# Patient Record
Sex: FEMALE | Race: WHITE | NOT HISPANIC OR LATINO | Employment: FULL TIME | URBAN - METROPOLITAN AREA
[De-identification: names, ages, dates, MRNs, and addresses within clinical notes are randomized per-mention and may not be internally consistent; named-entity substitution may affect disease eponyms.]

---

## 2017-02-21 ENCOUNTER — OFFICE VISIT (OUTPATIENT)
Dept: FAMILY MEDICINE | Facility: CLINIC | Age: 42
End: 2017-02-21
Payer: COMMERCIAL

## 2017-02-21 VITALS
TEMPERATURE: 97.6 F | BODY MASS INDEX: 28.45 KG/M2 | HEART RATE: 66 BPM | SYSTOLIC BLOOD PRESSURE: 100 MMHG | HEIGHT: 66 IN | DIASTOLIC BLOOD PRESSURE: 66 MMHG | RESPIRATION RATE: 16 BRPM | WEIGHT: 177 LBS | OXYGEN SATURATION: 99 %

## 2017-02-21 DIAGNOSIS — Z00.8 ENCOUNTER FOR BIOMETRIC SCREENING: ICD-10-CM

## 2017-02-21 DIAGNOSIS — B07.0 PLANTAR WARTS: ICD-10-CM

## 2017-02-21 DIAGNOSIS — Z00.01 ENCOUNTER FOR ROUTINE ADULT MEDICAL EXAM WITH ABNORMAL FINDINGS: Primary | ICD-10-CM

## 2017-02-21 DIAGNOSIS — Z13.6 CARDIOVASCULAR SCREENING; LDL GOAL LESS THAN 160: ICD-10-CM

## 2017-02-21 LAB
CHOLEST SERPL-MCNC: 154 MG/DL
GLUCOSE SERPL-MCNC: 93 MG/DL (ref 70–99)
HDLC SERPL-MCNC: 53 MG/DL
LDLC SERPL CALC-MCNC: 87 MG/DL
NONHDLC SERPL-MCNC: 101 MG/DL
TRIGL SERPL-MCNC: 68 MG/DL

## 2017-02-21 PROCEDURE — 99395 PREV VISIT EST AGE 18-39: CPT | Mod: 25 | Performed by: FAMILY MEDICINE

## 2017-02-21 PROCEDURE — 82947 ASSAY GLUCOSE BLOOD QUANT: CPT | Performed by: FAMILY MEDICINE

## 2017-02-21 PROCEDURE — 17110 DESTRUCTION B9 LES UP TO 14: CPT | Performed by: FAMILY MEDICINE

## 2017-02-21 PROCEDURE — 36415 COLL VENOUS BLD VENIPUNCTURE: CPT | Performed by: FAMILY MEDICINE

## 2017-02-21 PROCEDURE — 80061 LIPID PANEL: CPT | Performed by: FAMILY MEDICINE

## 2017-02-21 NOTE — NURSING NOTE
"Chief Complaint   Patient presents with     Physical     /66 (BP Location: Left arm, Cuff Size: Adult Regular)  Pulse 66  Temp 97.6  F (36.4  C) (Oral)  Resp 16  Ht 5' 6\" (1.676 m)  Wt 177 lb (80.3 kg)  SpO2 99%  BMI 28.57 kg/m2 Estimated body mass index is 28.57 kg/(m^2) as calculated from the following:    Height as of this encounter: 5' 6\" (1.676 m).    Weight as of this encounter: 177 lb (80.3 kg).  bp completed using cuff size: regular       Health Maintenance addressed:  NONE    n/a    Korin Rosenthal MA     "

## 2017-02-21 NOTE — MR AVS SNAPSHOT
After Visit Summary   2/21/2017    Cheyenne Kapoor    MRN: [id number]           Patient Information     Date Of Birth          1975        Visit Information        Provider Department      2/21/2017 8:00 AM Maris Garces MD [facility]        Today's Diagnoses     Encounter for routine adult medical exam with abnormal findings    -  1    Plantar warts        CARDIOVASCULAR SCREENING; LDL GOAL LESS THAN 160        Encounter for biometric screening          Care Instructions      Preventive Health Recommendations  Female Ages 26 - 39  Yearly exam:   See your health care provider every year in order to    Review health changes.     Discuss preventive care.      Review your medicines if you your doctor has prescribed any.    Until age 30: Get a Pap test every three years (more often if you have had an abnormal result).    After age 30: Talk to your doctor about whether you should have a Pap test every 3 years or have a Pap test with HPV screening every 5 years.   You do not need a Pap test if your uterus was removed (hysterectomy) and you have not had cancer.  You should be tested each year for STDs (sexually transmitted diseases), if you're at risk.   Talk to your provider about how often to have your cholesterol checked.  If you are at risk for diabetes, you should have a diabetes test (fasting glucose).  Shots: Get a flu shot each year. Get a tetanus shot every 10 years.   Nutrition:     Eat at least 5 servings of fruits and vegetables each day.    Eat whole-grain bread, whole-wheat pasta and brown rice instead of white grains and rice.    Talk to your provider about Calcium and Vitamin D.     Lifestyle    Exercise at least 150 minutes a week (30 minutes a day, 5 days of the week). This will help you control your weight and prevent disease.    Limit alcohol to one drink per day.    No smoking.     Wear sunscreen to prevent skin cancer.    See your dentist every six  "months for an exam and cleaning.          Follow-ups after your visit        Who to contact     If you have questions or need follow up information about today's clinic visit or your schedule please contact [facility] directly at [phone].  Normal or non-critical lab and imaging results will be communicated to you by Synfor[employer]art, letter or phone within 4 business days after the clinic has received the results. If you do not hear from us within 7 days, please contact the clinic through [employer] or phone. If you have a critical or abnormal lab result, we will notify you by phone as soon as possible.  Submit refill requests through [employer] or call your pharmacy and they will forward the refill request to us. Please allow 3 business days for your refill to be completed.          Additional Information About Your Visit        [employer] Information     [employer] gives you secure access to your electronic health record. If you see a primary care provider, you can also send messages to your care team and make appointments. If you have questions, please call your primary care clinic.  If you do not have a primary care provider, please call [phone] and they will assist you.        Care EveryWhere ID     This is your Care EveryWhere ID. This could be used by other organizations to access your [place] medical records  [id number]        Your Vitals Were     Pulse Temperature Respirations Height Pulse Oximetry BMI (Body Mass Index)    66 97.6  F (36.4  C) (Oral) 16 5' 6\" (1.676 m) 99% 28.57 kg/m2       Blood Pressure from Last 3 Encounters:   02/21/17 100/66   08/05/16 104/70   02/16/16 96/66    Weight from Last 3 Encounters:   02/21/17 177 lb (80.3 kg)   08/05/16 169 lb (76.7 kg)   02/16/16 168 lb 6.4 oz (76.4 kg)              We Performed the Following     Glucose     Lipid Profile with reflex to direct LDL        Primary Care Provider Office Phone # Fax #    Maris Garces [phone]    "    [facility] [address] [facility]  [address]  [facility] [address]        Thank you!     Thank you for choosing [facility]  for your care. Our goal is always to provide you with excellent care. Hearing back from our patients is one way we can continue to improve our services. Please take a few minutes to complete the written survey that you may receive in the mail after your visit with us. Thank you!             Your Updated Medication List - Protect others around you: Learn how to safely use, store and throw away your medicines at www.disposemymeds.org.          This list is accurate as of: 2/21/17  8:32 AM.  Always use your most recent med list.                   Brand Name Dispense Instructions for use    cholecalciferol 1000 UNIT tablet    vitamin D    100 tablet    Take 1-2 tablets (1,000-2,000 Units) by mouth daily       MIRENA (52 MG) 20 MCG/24HR IUD   Generic drug:  levonorgestrel      1 each by Intrauterine route once Placed 07/21/2011       vitamin B complex with vitamin C Tabs tablet      Take 1 tablet by mouth daily

## 2017-02-21 NOTE — PROGRESS NOTES
SUBJECTIVE:     CC: Cheyenne Kapoor is an 41 year old woman who presents for preventive health visit.     Physical   Annual:     Getting at least 3 servings of Calcium per day::  Yes    Bi-annual eye exam::  NO    Dental care twice a year::  Yes    Sleep apnea or symptoms of sleep apnea::  None    Diet::  Regular (no restrictions)    Frequency of exercise::  4-5 days/week    Duration of exercise::  45-60 minutes    Taking medications regularly::  Yes    Medication side effects::  Not applicable    Additional concerns today::  No    Mirena-   Taken out mirena IUD at last appt- misses it.  Doesn't need it-  got vasectomy.  Notices more ups and downs with mood.  Periods are back, but just two days, though each period is progressively more flow.  Never debilitating.  Fine staying off it unless sx's significantly worse.      Left heel- plantar wart, likely a few yrs.  Tried acid patches.  Not going away.    Weight issues-  Wants to lose ~10 lbs- watching diet and exercising.  Last year was ~15 lbs lighter.  Still down overall.  [employer] in 11/15 to ~2/16.  Stable in 8/16, now up ~15 lbs- at this weight for ~2 lbs.  Felt amazing on the [employer].  Does use some of their snacks now.  Focuses on a healthy gut, which helps her feel better.  Has to go and see them 2x/wk, consuming.  When she hit her weight, she wanted to be done.    Exercise- crossfit 3x/wk, then walks and ski- downhill.  Next week- [place] skiing.        Today's PHQ-2 Score:   PHQ-2 ( 1999 Pfizer) 2/20/2017   Q1: Little interest or pleasure in doing things -   Q2: Feeling down, depressed or hopeless -   PHQ-2 Score -   Little interest or pleasure in doing things Not at all   Feeling down, depressed or hopeless Not at all   PHQ-2 Score 0       Abuse: Current or Past(Physical, Sexual or Emotional)- No  Do you feel safe in your environment - Yes    Social History   Substance Use Topics     Smoking status: Never Smoker     Smokeless  tobacco: Never Used     Alcohol use Yes      Comment: 2 drinks monthly     The patient does not drink >3 drinks per day nor >7 drinks per week.    Recent Labs   Lab Test  02/16/16   0859 02/09/15  11/08/13   0840  02/10/12   0919   CHOL  164  168  160  153   HDL  48*  59  47*  55   LDL  109*  99  104  87   TRIG  34  52  47  58   CHOLHDLRATIO   --    --   3.4  2.8   NHDL  116   --    --    --        Reviewed orders with patient.  Reviewed health maintenance and updated orders accordingly - Yes    Mammo Decision Support:  Patient under age 50, mutual decision reflected in health maintenance.      Pertinent mammograms are reviewed under the imaging tab.  History of abnormal Pap smear: NO - age 30-65 PAP every 5 years with negative HPV co-testing recommended  All Histories reviewed and updated in Epic.    Patient Active Problem List   Diagnosis     Seasonal allergic rhinitis     CARDIOVASCULAR SCREENING; LDL GOAL LESS THAN 160     Melanocytic nevus of left lower extremity      Current Outpatient Prescriptions   Medication Sig Dispense Refill     vitamin  B complex with vitamin C (VITAMIN  B COMPLEX) TABS Take 1 tablet by mouth daily       cholecalciferol (VITAMIN D) 1000 UNIT tablet Take 1-2 tablets (1,000-2,000 Units) by mouth daily 100 tablet 3     levonorgestrel (MIRENA) 20 MCG/24HR IUD 1 each by Intrauterine route once Placed 07/21/2011           Allergies   Allergen Reactions     Amoxicillin Rash     Ceclor [Cefaclor Monohydrate] Rash     Penicillin G Rash        ROS:   ROS: 10 point ROS neg other than the symptoms noted above in the HPI.    Problem list, Medication list, Allergies, and Medical/Social/Surgical histories reviewed in [facility] and updated as appropriate.  BP Readings from Last 3 Encounters:   02/21/17 100/66   08/05/16 104/70   02/16/16 96/66    Wt Readings from Last 3 Encounters:   02/21/17 177 lb (80.3 kg)   08/05/16 169 lb (76.7 kg)   02/16/16 168 lb 6.4 oz (76.4 kg)           OBJECTIVE:     /66  "(BP Location: Left arm, Cuff Size: Adult Regular)  Pulse 66  Temp 97.6  F (36.4  C) (Oral)  Resp 16  Ht 5' 6\" (1.676 m)  Wt 177 lb (80.3 kg)  SpO2 99%  BMI 28.57 kg/m2  EXAM:  GENERAL: healthy, alert and no distress  EYES: Eyes grossly normal to inspection, PERRL and conjunctivae and sclerae normal  HENT: ear canals and TM's normal, nose and mouth without ulcers or lesions  NECK: no adenopathy, no asymmetry, masses, or scars and thyroid normal to palpation  RESP: lungs clear to auscultation - no rales, rhonchi or wheezes  BREAST: normal without masses, tenderness or nipple discharge and no palpable axillary masses or adenopathy  CV: regular rate and rhythm, normal S1 S2, no S3 or S4, no murmur, click or rub, no peripheral edema and peripheral pulses strong  ABDOMEN: soft, nontender, no hepatosplenomegaly, no masses and bowel sounds normal  MS: no gross musculoskeletal defects noted, no edema  SKIN: no suspicious lesions or rashes, other than ~4mm plantar wart on left heel, disrupts skin lines  NEURO: Normal strength and tone, mentation intact and speech normal  PSYCH: mentation appears normal, affect normal/bright    ASSESSMENT/PLAN:         ICD-10-CM    1. Encounter for routine adult medical exam with abnormal findings Z00.01    2. Plantar warts B07.0 DESTRUCT BENIGN LESION, UP TO 14   3. CARDIOVASCULAR SCREENING; LDL GOAL LESS THAN 160 Z13.6 Lipid Profile with reflex to direct LDL   4. Encounter for biometric screening Z01.89 Lipid Profile with reflex to direct LDL     Glucose     CPE- Discussed diet, calcium and exercise.  Went over Self Breast Exam.  Thin prep pap was not done.  Eyes and Teeth or UTD or recommended f/u.  No immunizations needed today.  See orders below for tests ordered and screening needed.      Plantar wart- left heel, ~4mm size.  There for a few yrs.  Discussed txt options, pt elects to treat with cryotherapy today.  Discussed risks/benefits, pt elects to proceed.  Plantar wart was frozen " "easily with pulse mechanism with liquid nitrogen for ~15 seconds, maintaining good white weel around wart.  A total of 1 warts are treated today.  The etiology of common warts were discussed.  Cheyenne will continue to use the over-the-counter medications such as Compound W under occlusion on a nightly  basis.  Warm soapy water soaks and sanding also recommended.  The patient is to rtc as able q3-4 weeks until all warts have resolved.    Biometric forms- would like to get a copy sent to her- can fax in to company and send copy to her.      COUNSELING:  Reviewed preventive health counseling, as reflected in patient instructions  Special attention given to:        Regular exercise       Healthy diet/nutrition       Contraception       Osteoporosis Prevention/Bone Health       (Elyssa)menopause management         reports that she has never smoked. She has never used smokeless tobacco.    Estimated body mass index is 26.47 kg/(m^2) as calculated from the following:    Height as of 8/5/16: 5' 7\" (1.702 m).    Weight as of 8/5/16: 169 lb (76.7 kg).   Weight management plan: cont work on diet/exercise- discussed tips/options    Counseling Resources:  ATP IV Guidelines  Pooled Cohorts Equation Calculator  Breast Cancer Risk Calculator  FRAX Risk Assessment  ICSI Preventive Guidelines  Dietary Guidelines for Americans, 2010Answers for HPI/ROS submitted by the patient on 2/20/2017   PHQ-2 Depressed: Not at all, Not at all  PHQ-2 Score: 0    USDA's MyPlate  ASA Prophylaxis  Lung CA Screening    Maris Garces MD      "

## 2017-02-23 NOTE — PROGRESS NOTES
Here are your lab results from your recent visit...  -Your glucose is in the normal range at 93 (<100 is normal), so there is no sign of diabetes or pre-diabetes.   -Your cholesterol panel also looks great with a very low LDL (the bad cholesterol) and a pretty good HDL (the good cholesterol).     Please let me know if you have any questions.  Best,   Conrad Garces MD

## 2017-09-26 ENCOUNTER — OFFICE VISIT (OUTPATIENT)
Dept: FAMILY MEDICINE | Facility: CLINIC | Age: 42
End: 2017-09-26
Payer: COMMERCIAL

## 2017-09-26 VITALS
WEIGHT: 179 LBS | HEART RATE: 58 BPM | OXYGEN SATURATION: 100 % | RESPIRATION RATE: 14 BRPM | DIASTOLIC BLOOD PRESSURE: 68 MMHG | BODY MASS INDEX: 28.77 KG/M2 | SYSTOLIC BLOOD PRESSURE: 98 MMHG | TEMPERATURE: 97.9 F | HEIGHT: 66 IN

## 2017-09-26 DIAGNOSIS — F32.1 MODERATE SINGLE CURRENT EPISODE OF MAJOR DEPRESSIVE DISORDER (H): Primary | ICD-10-CM

## 2017-09-26 PROCEDURE — 99214 OFFICE O/P EST MOD 30 MIN: CPT | Performed by: FAMILY MEDICINE

## 2017-09-26 PROCEDURE — 84443 ASSAY THYROID STIM HORMONE: CPT | Performed by: FAMILY MEDICINE

## 2017-09-26 PROCEDURE — 36415 COLL VENOUS BLD VENIPUNCTURE: CPT | Performed by: FAMILY MEDICINE

## 2017-09-26 PROCEDURE — 82306 VITAMIN D 25 HYDROXY: CPT | Performed by: FAMILY MEDICINE

## 2017-09-26 RX ORDER — ESCITALOPRAM OXALATE 10 MG/1
10 TABLET ORAL DAILY
Qty: 30 TABLET | Refills: 1 | Status: SHIPPED | OUTPATIENT
Start: 2017-09-26 | End: 2017-11-10

## 2017-09-26 ASSESSMENT — PATIENT HEALTH QUESTIONNAIRE - PHQ9
5. POOR APPETITE OR OVEREATING: SEVERAL DAYS
SUM OF ALL RESPONSES TO PHQ QUESTIONS 1-9: 8

## 2017-09-26 ASSESSMENT — ANXIETY QUESTIONNAIRES
5. BEING SO RESTLESS THAT IT IS HARD TO SIT STILL: NOT AT ALL
GAD7 TOTAL SCORE: 6
1. FEELING NERVOUS, ANXIOUS, OR ON EDGE: SEVERAL DAYS
3. WORRYING TOO MUCH ABOUT DIFFERENT THINGS: MORE THAN HALF THE DAYS
7. FEELING AFRAID AS IF SOMETHING AWFUL MIGHT HAPPEN: NOT AT ALL
IF YOU CHECKED OFF ANY PROBLEMS ON THIS QUESTIONNAIRE, HOW DIFFICULT HAVE THESE PROBLEMS MADE IT FOR YOU TO DO YOUR WORK, TAKE CARE OF THINGS AT HOME, OR GET ALONG WITH OTHER PEOPLE: SOMEWHAT DIFFICULT
6. BECOMING EASILY ANNOYED OR IRRITABLE: SEVERAL DAYS
2. NOT BEING ABLE TO STOP OR CONTROL WORRYING: SEVERAL DAYS

## 2017-09-26 NOTE — MR AVS SNAPSHOT
"              After Visit Summary   9/26/2017    Cheyenne Kapoor    MRN: [id number]           Patient Information     Date Of Birth          1975        Visit Information        Provider Department      9/26/2017 11:30 AM Maris Garces MD [facility]        Today's Diagnoses     Moderate single current episode of major depressive disorder (H)    -  1       Follow-ups after your visit        Who to contact     If you have questions or need follow up information about today's clinic visit or your schedule please contact [facility] directly at [phone].  Normal or non-critical lab and imaging results will be communicated to you by [employer], letter or phone within 4 business days after the clinic has received the results. If you do not hear from us within 7 days, please contact the clinic through [employer] or phone. If you have a critical or abnormal lab result, we will notify you by phone as soon as possible.  Submit refill requests through [employer] or call your pharmacy and they will forward the refill request to us. Please allow 3 business days for your refill to be completed.          Additional Information About Your Visit        MyChart Information     [employer] gives you secure access to your electronic health record. If you see a primary care provider, you can also send messages to your care team and make appointments. If you have questions, please call your primary care clinic.  If you do not have a primary care provider, please call [phone] and they will assist you.        Care EveryWhere ID     This is your Care EveryWhere ID. This could be used by other organizations to access your [place] medical records  [id number]        Your Vitals Were     Pulse Temperature Respirations Height Pulse Oximetry Breastfeeding?    58 97.9  F (36.6  C) (Oral) 14 5' 6\" (1.676 m) 100% No    BMI (Body Mass Index)                   28.89 kg/m2            Blood Pressure from " Last 3 Encounters:   09/26/17 98/68   02/21/17 100/66   08/05/16 104/70    Weight from Last 3 Encounters:   09/26/17 179 lb (81.2 kg)   02/21/17 177 lb (80.3 kg)   08/05/16 169 lb (76.7 kg)              We Performed the Following     TSH with free T4 reflex     Vitamin D Deficiency          Today's Medication Changes          These changes are accurate as of: 9/26/17 12:17 PM.  If you have any questions, ask your nurse or doctor.               Start taking these medicines.        Dose/Directions    escitalopram 10 MG tablet   Commonly known as:  LEXAPRO   Used for:  Moderate single current episode of major depressive disorder (H)   Started by:  Maris Garces MD        Dose:  10 mg   Take 1 tablet (10 mg) by mouth daily   Quantity:  30 tablet   Refills:  1            Where to get your medicines      These medications were sent to [facility] [id number] [place] [address] [facility]  [facility] [place] [address]     Phone:  [phone]     escitalopram 10 MG tablet                Primary Care Provider Office Phone # Fax #    Maris Garces [phone] [address] [address]        Equal Access to Services     BRENDA MADRIGAL AH: Hadii aad ku hadasho Soomaali, waaxda luqadaha, qaybta kaalmada adeegyada, waxay taliain hayaany cardenas . So [facility] [phone].    ATENCIÓN: Si habla español, tiene a knight disposición servicios gratuitos de asistencia lingüística. Llame al [phone].    We comply with applicable federal civil rights laws and [place] laws. We do not discriminate on the basis of race, color, national origin, age, disability sex, sexual orientation or gender identity.            Thank you!     Thank you for choosing [facility]  for your care. Our goal is always to provide you with excellent care. Hearing back from our patients is one way we can continue to improve our services.  Please take a few minutes to complete the written survey that you may receive in the mail after your visit with us. Thank you!             Your Updated Medication List - Protect others around you: Learn how to safely use, store and throw away your medicines at www.disposemymeds.org.          This list is accurate as of: 9/26/17 12:17 PM.  Always use your most recent med list.                   Brand Name Dispense Instructions for use Diagnosis    cholecalciferol 1000 UNIT tablet    vitamin D    100 tablet    Take 1-2 tablets (1,000-2,000 Units) by mouth daily    Encounter for routine adult health examination without abnormal findings       escitalopram 10 MG tablet    LEXAPRO    30 tablet    Take 1 tablet (10 mg) by mouth daily    Moderate single current episode of major depressive disorder (H)       vitamin B complex with vitamin C Tabs tablet      Take 1 tablet by mouth daily

## 2017-09-26 NOTE — NURSING NOTE
"Chief Complaint   Patient presents with     Depression     Anxiety       Initial BP 98/68  Pulse 58  Temp 97.9  F (36.6  C) (Oral)  Resp 14  Ht 5' 6\" (1.676 m)  Wt 179 lb (81.2 kg)  SpO2 100%  Breastfeeding? No  BMI 28.89 kg/m2 Estimated body mass index is 28.89 kg/(m^2) as calculated from the following:    Height as of this encounter: 5' 6\" (1.676 m).    Weight as of this encounter: 179 lb (81.2 kg).  BP completed using cuff size: regular    Health Maintenance that is potentially due pending provider review:  Health Maintenance Due   Topic Date Due     INFLUENZA VACCINE (SYSTEM ASSIGNED)  09/01/2017     PHQ9 and GAD7 given to pt    Per pt and provider  "

## 2017-09-26 NOTE — PROGRESS NOTES
SUBJECTIVE:   Cheyenne Kapoor is a 42 year old female who presents to clinic today for the following health issues:    Abnormal Mood Symptoms    Duration: 6-7 months    Description:    Depression: Sadness and lack of motivation, symptoms are worst around time of period, started after removal of IUD in 8/16  Anxiety: Wakes up during night and unable to fall back asleep due to racing thoughts, occurs 3x per month  Panic attacks: None     Accompanying signs and symptoms: None    History: Therapy 2 years ago when feeling overwhelmed with family and work, states that it was effective but does not believe it would be beneficial at this time    Precipitating or alleviating factors: None    Therapies tried and outcome: None, tries to maintain healthy diet and exercises 3-4x per week but mood sometimes interferes      Problem list and histories reviewed and adjusted: As indicated  Additional history: As documented    Patient Active Problem List   Diagnosis     Seasonal allergic rhinitis     CARDIOVASCULAR SCREENING; LDL GOAL LESS THAN 160     Melanocytic nevus of left lower extremity     Past Surgical History:   Procedure Laterality Date     C IUD,MIRENA  7/11       Social History   Substance Use Topics     Smoking status: Never Smoker     Smokeless tobacco: Never Used     Alcohol use Yes      Comment: 2 drinks monthly     Family History   Problem Relation Age of Onset     Allergies Mother      Lipids Mother      Obesity Mother      Allergies Father      Lipids Father      Arthritis Father      Eye Disorder Father      macular degeneration     Allergies Sister      Alzheimer Disease Paternal Grandmother      Alzheimer Disease Maternal Grandfather      Arthritis Paternal Grandfather      Lipids Sister          Current Outpatient Prescriptions   Medication Sig Dispense Refill     escitalopram (LEXAPRO) 10 MG tablet Take 1 tablet (10 mg) by mouth daily 30 tablet 1     vitamin  B complex with vitamin C (VITAMIN  B  "COMPLEX) TABS Take 1 tablet by mouth daily       cholecalciferol (VITAMIN D) 1000 UNIT tablet Take 1-2 tablets (1,000-2,000 Units) by mouth daily 100 tablet 3     Allergies   Allergen Reactions     Amoxicillin Rash     Ceclor [Cefaclor Monohydrate] Rash     Penicillin G Rash     Recent Labs   Lab Test  09/26/17   1219  02/21/17   0855  02/16/16   0859 02/09/15   LDL   --   87  109*  99   HDL   --   53  48*  59   TRIG   --   68  34  52   TSH  1.12   --    --    --       BP Readings from Last 3 Encounters:   09/26/17 98/68   02/21/17 100/66   08/05/16 104/70    Wt Readings from Last 3 Encounters:   09/26/17 179 lb (81.2 kg)   02/21/17 177 lb (80.3 kg)   08/05/16 169 lb (76.7 kg)               Labs reviewed in EPIC    Reviewed and updated as needed this visit by clinical staffTobacco  Allergies  Meds  Problems  Med Hx  Surg Hx  Fam Hx  Soc Hx        Reviewed and updated as needed this visit by Provider  Allergies  Meds  Problems         ROS:  Constitutional, HEENT, cardiovascular, pulmonary, GI and  systems are negative, except as otherwise noted.    OBJECTIVE:   BP 98/68  Pulse 58  Temp 97.9  F (36.6  C) (Oral)  Resp 14  Ht 5' 6\" (1.676 m)  Wt 179 lb (81.2 kg)  SpO2 100%  Breastfeeding? No  BMI 28.89 kg/m2  Body mass index is 28.89 kg/(m^2).    GENERAL: Healthy, alert, and no distress  EYES: Eyes grossly normal to inspection, PERRL, conjunctivae and sclerae normal  NECK: No adenopathy, no asymmetry, masses, or scars, thyroid normal to palpation  RESP: Lungs clear to auscultation, no rales, rhonchi, or wheezes  CV: Regular rate and rhythm, normal S1 and S2, no S3 or S4, no murmur, click, or rub, no peripheral edema   ABDOMEN: Soft, nontender, no hepatosplenomegaly, no masses, and bowel sounds normal  MS: No gross musculoskeletal defects noted  NEURO: Normal strength and tone, mentation intact, and speech normal  BACK: No CVA tenderness, no paralumbar tenderness  PSYCH: Mentation appears normal, " concerned about changes in mood but affect remains normal and bright     Diagnostic Test Results:    TSH with free T4 = pending    Vitamin D = pending    ASSESSMENT/PLAN:        Diagnosis Comments   1. Moderate single current episode of major depressive disorder (H)  escitalopram (LEXAPRO) 10 MG tablet, TSH with free T4 reflex, Vitamin D Deficiency      1. Moderate single current episode of major depressive disorder    6-7 months of sadness and lack of motivation    Anxiety at night keeping her up for hrs ~3x per month     Depressive symptoms worse around menstruation, anxiety symptoms are constant     Ordered labs for vitamin D and TSH to rule out secondary causes of anxiety and depression     Discussed pros and cons of various SSRIs including effects on mood, sleep, weight gain, and libido     Provided prescription for escitalopram (LEXAPRO) 10 MG tablet    Instructed that SSRIs take up to 6 weeks to take full effect    Encouraged to continue maintaining a healthy diet and exercising 3-4x per week     Encouraged to F/U in 6 weeks for medication check      Maris Garces MD  [facility]

## 2017-09-27 LAB
DEPRECATED CALCIDIOL+CALCIFEROL SERPL-MC: 28 UG/L (ref 20–75)
TSH SERPL DL<=0.005 MIU/L-ACNC: 1.12 MU/L (ref 0.4–4)

## 2017-09-27 ASSESSMENT — ANXIETY QUESTIONNAIRES: GAD7 TOTAL SCORE: 6

## 2017-10-03 NOTE — PROGRESS NOTES
Here are your lab results from your recent visit...  -Your TSH (thyroid stimulating hormone, which is elevated in hypothyroidism, and lowered in hyperthyroidism) is normal.  -Your Vitamin D level is on the lower end of normal, so starting/increasing Vitamin D supplementation could be helpful.  If you are not taking any currently, I would take Vit D3 2000 units/day.  If you are taking some now, you could increase your dose (and will discuss it further at your next appointment).    Please let me know if you have any questions.  Best,   Conrad Garces MD

## 2017-10-06 ENCOUNTER — TRANSFERRED RECORDS (OUTPATIENT)
Dept: HEALTH INFORMATION MANAGEMENT | Facility: CLINIC | Age: 42
End: 2017-10-06

## 2017-10-06 LAB
CHOLEST SERPL-MCNC: 175 MG/DL (ref 125–199)
GLUCOSE SERPL-MCNC: 92 MG/DL (ref 65–99)
HDLC SERPL-MCNC: 62 MG/DL
LDLC SERPL CALC-MCNC: 101 MG/DL
TRIGL SERPL-MCNC: 42 MG/DL

## 2017-11-10 ENCOUNTER — OFFICE VISIT (OUTPATIENT)
Dept: FAMILY MEDICINE | Facility: CLINIC | Age: 42
End: 2017-11-10
Payer: COMMERCIAL

## 2017-11-10 VITALS
TEMPERATURE: 97.7 F | OXYGEN SATURATION: 98 % | WEIGHT: 181.4 LBS | DIASTOLIC BLOOD PRESSURE: 69 MMHG | HEART RATE: 60 BPM | SYSTOLIC BLOOD PRESSURE: 107 MMHG | HEIGHT: 66 IN | BODY MASS INDEX: 29.15 KG/M2

## 2017-11-10 DIAGNOSIS — F32.1 MODERATE SINGLE CURRENT EPISODE OF MAJOR DEPRESSIVE DISORDER (H): Primary | ICD-10-CM

## 2017-11-10 DIAGNOSIS — E55.9 VITAMIN D DEFICIENCY: ICD-10-CM

## 2017-11-10 PROCEDURE — 99214 OFFICE O/P EST MOD 30 MIN: CPT | Performed by: FAMILY MEDICINE

## 2017-11-10 RX ORDER — ESCITALOPRAM OXALATE 10 MG/1
10 TABLET ORAL DAILY
Qty: 90 TABLET | Refills: 1 | Status: SHIPPED | OUTPATIENT
Start: 2017-11-10 | End: 2018-03-09

## 2017-11-10 ASSESSMENT — ANXIETY QUESTIONNAIRES
5. BEING SO RESTLESS THAT IT IS HARD TO SIT STILL: NOT AT ALL
2. NOT BEING ABLE TO STOP OR CONTROL WORRYING: NOT AT ALL
1. FEELING NERVOUS, ANXIOUS, OR ON EDGE: NOT AT ALL
3. WORRYING TOO MUCH ABOUT DIFFERENT THINGS: NOT AT ALL
6. BECOMING EASILY ANNOYED OR IRRITABLE: SEVERAL DAYS
IF YOU CHECKED OFF ANY PROBLEMS ON THIS QUESTIONNAIRE, HOW DIFFICULT HAVE THESE PROBLEMS MADE IT FOR YOU TO DO YOUR WORK, TAKE CARE OF THINGS AT HOME, OR GET ALONG WITH OTHER PEOPLE: NOT DIFFICULT AT ALL
GAD7 TOTAL SCORE: 2
7. FEELING AFRAID AS IF SOMETHING AWFUL MIGHT HAPPEN: NOT AT ALL

## 2017-11-10 ASSESSMENT — PATIENT HEALTH QUESTIONNAIRE - PHQ9
5. POOR APPETITE OR OVEREATING: SEVERAL DAYS
SUM OF ALL RESPONSES TO PHQ QUESTIONS 1-9: 2

## 2017-11-10 NOTE — NURSING NOTE
"Chief Complaint   Patient presents with     Depression       Initial /69  Pulse 60  Temp 97.7  F (36.5  C) (Oral)  Ht 5' 6\" (1.676 m)  Wt 181 lb 6.4 oz (82.3 kg)  LMP 11/07/2017 (Approximate)  SpO2 98%  BMI 29.28 kg/m2 Estimated body mass index is 29.28 kg/(m^2) as calculated from the following:    Height as of this encounter: 5' 6\" (1.676 m).    Weight as of this encounter: 181 lb 6.4 oz (82.3 kg).  Medication Reconciliation: complete      Health Maintenance that is potentially due pending provider review:  NONE    n/a    ZBIGNIEW Hook  "

## 2017-11-10 NOTE — MR AVS SNAPSHOT
"              After Visit Summary   11/10/2017    Cheyenne Kapoor    MRN: [id number]           Patient Information     Date Of Birth          1975        Visit Information        Provider Department      11/10/2017 11:30 AM Maris Garces MD [facility]        Today's Diagnoses     Moderate single current episode of major depressive disorder (H)    -  1    Vitamin D deficiency           Follow-ups after your visit        Who to contact     If you have questions or need follow up information about today's clinic visit or your schedule please contact [facility] directly at [phone].  Normal or non-critical lab and imaging results will be communicated to you by Solovishart, letter or phone within 4 business days after the clinic has received the results. If you do not hear from us within 7 days, please contact the clinic through Notegraphyt or phone. If you have a critical or abnormal lab result, we will notify you by phone as soon as possible.  Submit refill requests through First Wind or call your pharmacy and they will forward the refill request to us. Please allow 3 business days for your refill to be completed.          Additional Information About Your Visit        MyChart Information     First [employer] gives you secure access to your electronic health record. If you see a primary care provider, you can also send messages to your care team and make appointments. If you have questions, please call your primary care clinic.  If you do not have a primary care provider, please call [phone] and they will assist you.        Care EveryWhere ID     This is your Care EveryWhere ID. This could be used by other organizations to access your [place] medical records  [id number]        Your Vitals Were     Pulse Temperature Height Last Period Pulse Oximetry BMI (Body Mass Index)    60 97.7  F (36.5  C) (Oral) 5' 6\" (1.676 m) 11/07/2017 (Approximate) 98% 29.28 kg/m2       Blood " Pressure from Last 3 Encounters:   11/10/17 107/69   09/26/17 98/68   02/21/17 100/66    Weight from Last 3 Encounters:   11/10/17 181 lb 6.4 oz (82.3 kg)   09/26/17 179 lb (81.2 kg)   02/21/17 177 lb (80.3 kg)              Today, you had the following     No orders found for display         Where to get your medicines      These medications were sent to [facility] [address] [place] [facility] [address] [address]  [address] [address]     Phone:  [phone]     escitalopram 10 MG tablet          Primary Care Provider Office Phone # Fax #    Maris Garecs [phone] [address] [address]        Equal Access to Services     MILE MADRIGAL : Hadii kailey nuno hadasho Soomaali, waaxda luqadaha, qaybta kaalmada adevenusyada, travis cardenas . So [facility] [phone].    ATENCIÓN: Si habla español, tiene a knight disposición servicios gratuitos de asistencia lingüística. Dyllan al [phone].    We comply with applicable federal civil rights laws and [place] laws. We do not discriminate on the basis of race, color, national origin, age, disability, sex, sexual orientation, or gender identity.            Thank you!     Thank you for choosing [facility]  for your care. Our goal is always to provide you with excellent care. Hearing back from our patients is one way we can continue to improve our services. Please take a few minutes to complete the written survey that you may receive in the mail after your visit with us. Thank you!             Your Updated Medication List - Protect others around you: Learn how to safely use, store and throw away your medicines at www.disposemymeds.org.          This list is accurate as of: 11/10/17 12:09 PM.  Always use your most recent med list.                   Brand Name Dispense Instructions for use Diagnosis    cholecalciferol 1000 UNIT tablet    vitamin D3    100 tablet    Take 1-2  tablets (1,000-2,000 Units) by mouth daily    Encounter for routine adult health examination without abnormal findings       escitalopram 10 MG tablet    LEXAPRO    90 tablet    Take 1 tablet (10 mg) by mouth daily    Moderate single current episode of major depressive disorder (H)       vitamin B complex with vitamin C Tabs tablet      Take 1 tablet by mouth daily

## 2017-11-10 NOTE — PROGRESS NOTES
SUBJECTIVE:   Cheyenne Kapoor is a 42 year old female who presents to clinic today for the following health issues:      Depression Followup    Status since last visit: Improved with medication    See PHQ-9 for current symptoms.  Other associated symptoms: None    Complicating factors:   Significant life event:  No   Current substance abuse:  None  Anxiety or Panic symptoms:  No    PHQ-9 Score and MyChart F/U Questions 9/26/2017 11/10/2017   Total Score 8 2   Q9: Suicide Ideation Not at all Not at all     HEMANTH-7 SCORE 9/26/2017 11/10/2017   Total Score 6 2       -  PHQ-9  English  PHQ-9   Any Language  Suicide Assessment Five-step Evaluation and Treatment (SAFE-T)      Amount of exercise or physical activity: 3-4 days/week for an average of 45-60 minutes    Problems taking medications regularly: No    Medication side effects: none    Diet: regular (no restrictions)    With lexapro- feels less edgy, less anxious, wakes up feeling fine/normal (not waking up sad any longer).  Feels like her sleep is better as well.  Overall feels normal normal.  Less losing it with her kids- more tolerance.  Residual sx's- none.  SE's- hasn't notice any.      Work lab screening- lipids/glucose looked good.    Vit D was low- got drops- 2000 units/day, for past 6 wks.  Was taking it intermittently prior to that.      Problem list and histories reviewed & adjusted, as indicated.  Additional history: as documented    Patient Active Problem List   Diagnosis     Seasonal allergic rhinitis     CARDIOVASCULAR SCREENING; LDL GOAL LESS THAN 160     Melanocytic nevus of left lower extremity     Vitamin D deficiency     Past Surgical History:   Procedure Laterality Date     C IUD,MIRENA  7/11       Social History   Substance Use Topics     Smoking status: Never Smoker     Smokeless tobacco: Never Used     Alcohol use Yes      Comment: 2 drinks monthly     Family History   Problem Relation Age of Onset     Allergies Mother      Lipids  "Mother      Obesity Mother      Allergies Father      Lipids Father      Arthritis Father      Eye Disorder Father      macular degeneration     Allergies Sister      Alzheimer Disease Paternal Grandmother      Alzheimer Disease Maternal Grandfather      Arthritis Paternal Grandfather      Lipids Sister          Current Outpatient Prescriptions   Medication Sig Dispense Refill     escitalopram (LEXAPRO) 10 MG tablet Take 1 tablet (10 mg) by mouth daily 90 tablet 1     vitamin  B complex with vitamin C (VITAMIN  B COMPLEX) TABS Take 1 tablet by mouth daily       cholecalciferol (VITAMIN D) 1000 UNIT tablet Take 1-2 tablets (1,000-2,000 Units) by mouth daily 100 tablet 3     [DISCONTINUED] escitalopram (LEXAPRO) 10 MG tablet Take 1 tablet (10 mg) by mouth daily 30 tablet 1     Allergies   Allergen Reactions     Amoxicillin Rash     Ceclor [Cefaclor Monohydrate] Rash     Penicillin G Rash     Recent Labs   Lab Test  09/26/17   1219  02/21/17   0855  02/16/16   0859 02/09/15   LDL   --   87  109*  99   HDL   --   53  48*  59   TRIG   --   68  34  52   TSH  1.12   --    --    --       BP Readings from Last 3 Encounters:   11/10/17 107/69   09/26/17 98/68   02/21/17 100/66    Wt Readings from Last 3 Encounters:   11/10/17 181 lb 6.4 oz (82.3 kg)   09/26/17 179 lb (81.2 kg)   02/21/17 177 lb (80.3 kg)                  Labs reviewed in EPIC          Reviewed and updated as needed this visit by clinical staffTobacco  Allergies  Meds  Problems       Reviewed and updated as needed this visit by Provider  Allergies  Meds  Problems         ROS:  Constitutional, HEENT, cardiovascular, pulmonary, gi and gu systems are negative, except as otherwise noted.      OBJECTIVE:   /69  Pulse 60  Temp 97.7  F (36.5  C) (Oral)  Ht 5' 6\" (1.676 m)  Wt 181 lb 6.4 oz (82.3 kg)  LMP 11/07/2017 (Approximate)  SpO2 98%  BMI 29.28 kg/m2  Body mass index is 29.28 kg/(m^2).  GENERAL APPEARANCE: healthy, alert and no distress     " EYES: sclera clear, EOMI     RESP: lungs clear to auscultation - no rales, rhonchi or wheezes     CV: regular rates and rhythm, normal S1 S2, no S3 or S4 and no murmur, click or rub      Ext: warm, dry, no edema      Psych: full range affect, normal speech and grooming, judgement and insight intact     Diagnostic Test Results:  Results for orders placed or performed in visit on 09/26/17   TSH with free T4 reflex   Result Value Ref Range    TSH 1.12 0.40 - 4.00 mU/L   Vitamin D Deficiency   Result Value Ref Range    Vitamin D Deficiency screening 28 20 - 75 ug/L       ASSESSMENT/PLAN:       ICD-10-CM    1. Moderate single current episode of major depressive disorder (H) F32.1 escitalopram (LEXAPRO) 10 MG tablet   2. Vitamin D deficiency E55.9      MDD/irritatability- sx's significantly improved on the lexapro 10mg/d.  Mood better, less irritable, feels more normal.  No se's.  Will continue on 10mg/d.  F/u in ~3/17 with physical and f/u, then q6mo.  Will discuss pros/cons of going off after about a year on the medication.  Pt agrees with plan.    Vit D- low/normal level on check last time, so is now taking 2000 units daily consistently.    Maris Garces MD  [facility]

## 2017-11-11 ASSESSMENT — ANXIETY QUESTIONNAIRES: GAD7 TOTAL SCORE: 2

## 2018-03-09 ENCOUNTER — OFFICE VISIT (OUTPATIENT)
Dept: FAMILY MEDICINE | Facility: CLINIC | Age: 43
End: 2018-03-09
Payer: COMMERCIAL

## 2018-03-09 VITALS
HEART RATE: 85 BPM | HEIGHT: 66 IN | OXYGEN SATURATION: 95 % | SYSTOLIC BLOOD PRESSURE: 104 MMHG | BODY MASS INDEX: 29.46 KG/M2 | TEMPERATURE: 98.6 F | WEIGHT: 183.3 LBS | DIASTOLIC BLOOD PRESSURE: 72 MMHG

## 2018-03-09 DIAGNOSIS — Z13.6 CARDIOVASCULAR SCREENING; LDL GOAL LESS THAN 160: ICD-10-CM

## 2018-03-09 DIAGNOSIS — E55.9 VITAMIN D DEFICIENCY: ICD-10-CM

## 2018-03-09 DIAGNOSIS — F32.1 MODERATE SINGLE CURRENT EPISODE OF MAJOR DEPRESSIVE DISORDER (H): ICD-10-CM

## 2018-03-09 DIAGNOSIS — Z00.00 ENCOUNTER FOR ROUTINE ADULT HEALTH EXAMINATION WITHOUT ABNORMAL FINDINGS: Primary | ICD-10-CM

## 2018-03-09 PROCEDURE — 87624 HPV HI-RISK TYP POOLED RSLT: CPT | Performed by: FAMILY MEDICINE

## 2018-03-09 PROCEDURE — 99396 PREV VISIT EST AGE 40-64: CPT | Performed by: FAMILY MEDICINE

## 2018-03-09 PROCEDURE — G0145 SCR C/V CYTO,THINLAYER,RESCR: HCPCS | Performed by: FAMILY MEDICINE

## 2018-03-09 RX ORDER — ESCITALOPRAM OXALATE 10 MG/1
10 TABLET ORAL DAILY
Qty: 90 TABLET | Refills: 1 | Status: SHIPPED | OUTPATIENT
Start: 2018-03-09 | End: 2018-07-25

## 2018-03-09 ASSESSMENT — ANXIETY QUESTIONNAIRES
6. BECOMING EASILY ANNOYED OR IRRITABLE: NOT AT ALL
1. FEELING NERVOUS, ANXIOUS, OR ON EDGE: SEVERAL DAYS
3. WORRYING TOO MUCH ABOUT DIFFERENT THINGS: SEVERAL DAYS
2. NOT BEING ABLE TO STOP OR CONTROL WORRYING: NOT AT ALL
5. BEING SO RESTLESS THAT IT IS HARD TO SIT STILL: NOT AT ALL
GAD7 TOTAL SCORE: 2
7. FEELING AFRAID AS IF SOMETHING AWFUL MIGHT HAPPEN: NOT AT ALL
IF YOU CHECKED OFF ANY PROBLEMS ON THIS QUESTIONNAIRE, HOW DIFFICULT HAVE THESE PROBLEMS MADE IT FOR YOU TO DO YOUR WORK, TAKE CARE OF THINGS AT HOME, OR GET ALONG WITH OTHER PEOPLE: NOT DIFFICULT AT ALL

## 2018-03-09 ASSESSMENT — PATIENT HEALTH QUESTIONNAIRE - PHQ9: 5. POOR APPETITE OR OVEREATING: NOT AT ALL

## 2018-03-09 NOTE — MR AVS SNAPSHOT
After Visit Summary   3/9/2018    Cheyenne Kapoor    MRN: [id number]           Patient Information     Date Of Birth          1975        Visit Information        Provider Department      3/9/2018 8:30 AM Maris Garces MD [facility]        Today's Diagnoses     Encounter for routine adult health examination without abnormal findings    -  1    Moderate single current episode of major depressive disorder (H)        Vitamin D deficiency        CARDIOVASCULAR SCREENING; LDL GOAL LESS THAN 160          Care Instructions      Preventive Health Recommendations  Female Ages 40 to 49    Yearly exam:     See your health care provider every year in order to  1. Review health changes.   2. Discuss preventive care.    3. Review your medicines if your doctor prescribed any.      Get a Pap test every three years (unless you have an abnormal result and your provider advises testing more often).      If you get Pap tests with HPV test, you only need to test every 5 years, unless you have an abnormal result. You do not need a Pap test if your uterus was removed (hysterectomy) and you have not had cancer.      You should be tested each year for STDs (sexually transmitted diseases), if you're at risk.       Ask your doctor if you should have a mammogram.      Have a colonoscopy (test for colon cancer) if someone in your family has had colon cancer or polyps before age 50.       Have a cholesterol test every 5 years.       Have a diabetes test (fasting glucose) after age 45. If you are at risk for diabetes, you should have this test every 3 years.    Shots: Get a flu shot each year. Get a tetanus shot every 10 years.     Nutrition:     Eat at least 5 servings of fruits and vegetables each day.    Eat whole-grain bread, whole-wheat pasta and brown rice instead of white grains and rice.    Talk to your provider about Calcium and Vitamin D.     Lifestyle    Exercise at least 150  "minutes a week (an average of 30 minutes a day, 5 days a week). This will help you control your weight and prevent disease.    Limit alcohol to one drink per day.    No smoking.     Wear sunscreen to prevent skin cancer.    See your dentist every six months for an exam and cleaning.          Follow-ups after your visit        Who to contact     If you have questions or need follow up information about today's clinic visit or your schedule please contact [facility] directly at [phone].  Normal or non-critical lab and imaging results will be communicated to you by ClairMailhart, letter or phone within 4 business days after the clinic has received the results. If you do not hear from us within 7 days, please contact the clinic through Antenna or phone. If you have a critical or abnormal lab result, we will notify you by phone as soon as possible.  Submit refill requests through Antenna or call your pharmacy and they will forward the refill request to us. Please allow 3 business days for your refill to be completed.          Additional Information About Your Visit        ClairMailharKyron Information     Antenna gives you secure access to your electronic health record. If you see a primary care provider, you can also send messages to your care team and make appointments. If you have questions, please call your primary care clinic.  If you do not have a primary care provider, please call [phone] and they will assist you.        Care EveryWhere ID     This is your Care EveryWhere ID. This could be used by other organizations to access your [place] medical records  [id number]        Your Vitals Were     Pulse Temperature Height Pulse Oximetry BMI (Body Mass Index)       85 98.6  F (37  C) (Tympanic) 5' 6\" (1.676 m) 95% 29.59 kg/m2        Blood Pressure from Last 3 Encounters:   03/09/18 104/72   11/10/17 107/69   09/26/17 98/68    Weight from Last 3 Encounters:   03/09/18 183 lb 4.8 oz (83.1 kg)   11/10/17 181 lb " 6.4 oz (82.3 kg)   09/26/17 179 lb (81.2 kg)              We Performed the Following     HPV High Risk Types DNA Cervical     Pap imaged thin layer screen with HPV - recommended age 30 - 65 years (select HPV order below)          Where to get your medicines      These medications were [facility] [address] [place] [facility] [place] [place] [facility] [address] [address] [facility] [address]  [address] [address], [facility] [address]     Phone:  [phone]     escitalopram 10 MG tablet          Primary Care Provider Office Phone # Fax #    Maris Garces [phone] [address] [address]        Equal Access to Services     MILE MADRIGAL : Hadii kailey sheikho Sobart, waaxda luqadaha, qaybta kaalmada adevenusyada, travis cardenas . So [facility] [phone].    ATENCIÓN: Si habla español, tiene a knight disposición servicios gratuitos de asistencia lingüística. Llame al [phone].    We comply with applicable federal civil rights laws and [place] laws. We do not discriminate on the basis of race, color, national origin, age, disability, sex, sexual orientation, or gender identity.            Thank you!     Thank you for choosing [facility]  for your care. Our goal is always to provide you with excellent care. Hearing back from our patients is one way we can continue to improve our services. Please take a few minutes to complete the written survey that you may receive in the mail after your visit with us. Thank you!             Your Updated Medication List - Protect others around you: Learn how to safely use, store and throw away your medicines at www.disposemymeds.org.          This list is accurate as of 3/9/18  8:59 AM.  Always use your most recent med list.                   Brand Name Dispense Instructions for use Diagnosis    cholecalciferol 1000 UNIT tablet    vitamin D3    100 tablet    Take 1-2 tablets (1,000-2,000 Units) by mouth daily    Encounter for  routine adult health examination without abnormal findings       escitalopram 10 MG tablet    LEXAPRO    90 tablet    Take 1 tablet (10 mg) by mouth daily    Moderate single current episode of major depressive disorder (H)       vitamin B complex with vitamin C Tabs tablet      Take 1 tablet by mouth daily

## 2018-03-09 NOTE — NURSING NOTE
"Chief Complaint   Patient presents with     Physical     with pap     /72  Pulse 85  Temp 98.6  F (37  C) (Tympanic)  Ht 5' 6\" (1.676 m)  Wt 183 lb 4.8 oz (83.1 kg)  SpO2 95%  BMI 29.59 kg/m2 Estimated body mass index is 29.59 kg/(m^2) as calculated from the following:    Height as of this encounter: 5' 6\" (1.676 m).    Weight as of this encounter: 183 lb 4.8 oz (83.1 kg).  Medication Reconciliation: complete      Health Maintenance due pending provider review:  Pap Smear    PAP--Possibly completing today, per Provider review    Karolyn Lawson CMA  "

## 2018-03-09 NOTE — PROGRESS NOTES
SUBJECTIVE:   CC: Cheyenne Kapoor is an 42 year old woman who presents for preventive health visit.     Physical   Annual:     Getting at least 3 servings of Calcium per day::  Yes    Bi-annual eye exam::  NO    Dental care twice a year::  Yes    Sleep apnea or symptoms of sleep apnea::  None    Diet::  Regular (no restrictions)    Frequency of exercise::  4-5 days/week    Duration of exercise::  30-45 minutes    Taking medications regularly::  Yes    Medication side effects::  None    Additional concerns today::  No            MDD- still on the lexapro 10mg/d.  No se's.  Feels like it's helping.  DUGLAS passed away about a month ago- emotionally stressful.  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  Feels like she handled the stressors okay.    Taking Vit D- arlette gummies- 2000 units/day.    Has been really good about it- likes them.    Crossfit- 3-4x/wk.  Wishes it was more.    Diet-  Eggs/fruit/coffee  Snacks- fruit/veggies  Salad for lunch - good dressings  Dinner- meat/protein and - lean/fish more, red meat once a week.   Carbs- if sandwiches for lunch or piece of toast with eggs in morning- doesn't feel well afterwards.    Does go out a couple times a week.  Sometimes just eats too much.    Would like 10 lbs down.      Today's PHQ-2 Score:   PHQ-2 ( 1999 Pf[employer]) 3/9/2018   Q1: Little interest or pleasure in doing things 0   Q2: Feeling down, depressed or hopeless 0   PHQ-2 Score 0   Q1: Little interest or pleasure in doing things Not at all   Q2: Feeling down, depressed or hopeless Not at all   PHQ-2 Score 0       Abuse: Current or Past(Physical, Sexual or Emotional)- NO  Do you feel safe in your environment - YES    Social History   Substance Use Topics     Smoking status: Never Smoker     Smokeless tobacco: Never Used     Alcohol use Yes      Comment: 2 drinks monthly     No flowsheet data found.No flowsheet data found.    Reviewed orders with patient.  Reviewed health  maintenance and updated orders accordingly - Yes  Labs reviewed in EPIC  BP Readings from Last 3 Encounters:   03/09/18 104/72   11/10/17 107/69   09/26/17 98/68    Wt Readings from Last 3 Encounters:   03/09/18 183 lb 4.8 oz (83.1 kg)   11/10/17 181 lb 6.4 oz (82.3 kg)   09/26/17 179 lb (81.2 kg)                  Patient Active Problem List   Diagnosis     Seasonal allergic rhinitis     CARDIOVASCULAR SCREENING; LDL GOAL LESS THAN 160     Melanocytic nevus of left lower extremity     Vitamin D deficiency     Moderate single current episode of major depressive disorder (H)     Past Surgical History:   Procedure Laterality Date     C IUD,MIRENA  7/11       Social History   Substance Use Topics     Smoking status: Never Smoker     Smokeless tobacco: Never Used     Alcohol use Yes      Comment: 2 drinks monthly     Family History   Problem Relation Age of Onset     Allergies Mother      Lipids Mother      Obesity Mother      Allergies Father      Lipids Father      Arthritis Father      Eye Disorder Father      macular degeneration     Allergies Sister      Alzheimer Disease Paternal Grandmother      Alzheimer Disease Maternal Grandfather      Arthritis Paternal Grandfather      Lipids Sister          Current Outpatient Prescriptions   Medication Sig Dispense Refill     escitalopram (LEXAPRO) 10 MG tablet Take 1 tablet (10 mg) by mouth daily 90 tablet 1     [DISCONTINUED] escitalopram (LEXAPRO) 10 MG tablet Take 1 tablet (10 mg) by mouth daily 90 tablet 1     vitamin  B complex with vitamin C (VITAMIN  B COMPLEX) TABS Take 1 tablet by mouth daily       cholecalciferol (VITAMIN D) 1000 UNIT tablet Take 1-2 tablets (1,000-2,000 Units) by mouth daily 100 tablet 3     Allergies   Allergen Reactions     Amoxicillin Rash     Ceclor [Cefaclor Monohydrate] Rash     Penicillin G Rash     Recent Labs   Lab Test 10/06/17  09/26/17   1219  02/21/17   0855  02/16/16   0859   LDL  101   --   87  109*   HDL  62   --   53  48*   TRIG   "42   --   68  34   TSH   --   1.12   --    --         Patient under age 50, mutual decision reflected in health maintenance.      Pertinent mammograms are reviewed under the imaging tab.  History of abnormal Pap smear: NO - age 30-65 PAP every 5 years with negative HPV co-testing recommended    Reviewed and updated as needed this visit by clinical staff  Tobacco  Allergies  Meds  Problems  Med Hx  Surg Hx  Fam Hx  Soc Hx          Reviewed and updated as needed this visit by Provider  Allergies  Meds  Problems            Review of Systems  10 point ROS of systems including Constitutional, Eyes, Respiratory, Cardiovascular, Gastroenterology, Genitourinary, Integumentary, Muscularskeletal, Psychiatric were all negative except for pertinent positives noted in my HPI.       OBJECTIVE:   /72  Pulse 85  Temp 98.6  F (37  C) (Tympanic)  Ht 5' 6\" (1.676 m)  Wt 183 lb 4.8 oz (83.1 kg)  SpO2 95%  BMI 29.59 kg/m2  Physical Exam  GENERAL: healthy, alert and no distress  EYES: Eyes grossly normal to inspection, PERRL and conjunctivae and sclerae normal  HENT: ear canals and TM's normal, nose and mouth without ulcers or lesions  NECK: no adenopathy, no asymmetry, masses, or scars and thyroid normal to palpation  RESP: lungs clear to auscultation - no rales, rhonchi or wheezes  BREAST: normal without masses, tenderness or nipple discharge and no palpable axillary masses or adenopathy  CV: regular rate and rhythm, normal S1 S2, no S3 or S4, no murmur, click or rub, no peripheral edema and peripheral pulses strong  ABDOMEN: soft, nontender, no hepatosplenomegaly, no masses and bowel sounds normal   (female): normal female external genitalia, normal urethral meatus, vaginal mucosa pink, moist, well rugated, and normal cervix/adnexa/uterus without masses or discharge  MS: no gross musculoskeletal defects noted, no edema  SKIN: no suspicious lesions or rashes  NEURO: Normal strength and tone, mentation intact and " "speech normal  PSYCH: mentation appears normal, affect normal/bright    ASSESSMENT/PLAN:       ICD-10-CM    1. Encounter for routine adult health examination without abnormal findings Z00.00 Pap imaged thin layer screen with HPV - recommended age 30 - 65 years (select HPV order below)     HPV High Risk Types DNA Cervical   2. Moderate single current episode of major depressive disorder (H) F32.1 escitalopram (LEXAPRO) 10 MG tablet   3. Vitamin D deficiency E55.9    4. CARDIOVASCULAR SCREENING; LDL GOAL LESS THAN 160 Z13.6      CPE- Discussed diet, calcium and exercise.  Went over Self Breast Exam.  Thin prep pap was done.  Eyes and Teeth or UTD or recommended f/u.  No immunizations needed today.  See orders below for tests ordered and screening needed.      MDD- lexapro 10mg/d continuing to work well, no se's.  Started on it in 9/17, after worsening sx's after removing mirena IUD, likely partly hormonal.   Will cont for at least a year, so will discuss potential trial off at her 6mo f/u if doing well.  Risks and benefits of medication(s) including potential side effects reviewed with patient.  Questions answered.     BMI 29- Doing quite good exercise, but wt/BMI is still up.  Discussed working on sustainable diet changes, not a diet.  Would focus on more plant-based meals, and less eating out.          COUNSELING:  Reviewed preventive health counseling, as reflected in patient instructions  Special attention given to:        Regular exercise       Healthy diet/nutrition       Osteoporosis Prevention/Bone Health         reports that she has never smoked. She has never used smokeless tobacco.    Estimated body mass index is 29.59 kg/(m^2) as calculated from the following:    Height as of this encounter: 5' 6\" (1.676 m).    Weight as of this encounter: 183 lb 4.8 oz (83.1 kg).   Weight management plan: Discussed healthy diet and exercise guidelines and patient will follow up in 12 months in clinic to " re-evaluate.    Counseling Resources:  ATP IV Guidelines  Pooled Cohorts Equation Calculator  Breast Cancer Risk Calculator  FRAX Risk Assessment  ICSI Preventive Guidelines  Dietary Guidelines for Americans, 2010  USDA's MyPlate  ASA Prophylaxis  Lung CA Screening    Maris Garces MD  [facility]

## 2018-03-10 ASSESSMENT — PATIENT HEALTH QUESTIONNAIRE - PHQ9: SUM OF ALL RESPONSES TO PHQ QUESTIONS 1-9: 2

## 2018-03-10 ASSESSMENT — ANXIETY QUESTIONNAIRES: GAD7 TOTAL SCORE: 2

## 2018-03-13 LAB
COPATH REPORT: NORMAL
PAP: NORMAL

## 2018-03-14 LAB
FINAL DIAGNOSIS: NORMAL
HPV HR 12 DNA CVX QL NAA+PROBE: NEGATIVE
HPV16 DNA SPEC QL NAA+PROBE: NEGATIVE
HPV18 DNA SPEC QL NAA+PROBE: NEGATIVE
SPECIMEN DESCRIPTION: NORMAL
SPECIMEN SOURCE CVX/VAG CYTO: NORMAL

## 2018-07-25 ENCOUNTER — OFFICE VISIT (OUTPATIENT)
Dept: FAMILY MEDICINE | Facility: CLINIC | Age: 43
End: 2018-07-25
Payer: COMMERCIAL

## 2018-07-25 VITALS
BODY MASS INDEX: 31.39 KG/M2 | OXYGEN SATURATION: 97 % | TEMPERATURE: 98 F | WEIGHT: 195.3 LBS | HEART RATE: 55 BPM | HEIGHT: 66 IN | SYSTOLIC BLOOD PRESSURE: 106 MMHG | DIASTOLIC BLOOD PRESSURE: 70 MMHG

## 2018-07-25 DIAGNOSIS — F32.1 MODERATE SINGLE CURRENT EPISODE OF MAJOR DEPRESSIVE DISORDER (H): Primary | ICD-10-CM

## 2018-07-25 DIAGNOSIS — R63.5 WEIGHT GAIN: ICD-10-CM

## 2018-07-25 PROCEDURE — 99214 OFFICE O/P EST MOD 30 MIN: CPT | Performed by: FAMILY MEDICINE

## 2018-07-25 RX ORDER — ESCITALOPRAM OXALATE 10 MG/1
10 TABLET ORAL DAILY
Qty: 90 TABLET | Refills: 1 | Status: SHIPPED | OUTPATIENT
Start: 2018-07-25 | End: 2019-03-12

## 2018-07-25 ASSESSMENT — ANXIETY QUESTIONNAIRES
7. FEELING AFRAID AS IF SOMETHING AWFUL MIGHT HAPPEN: NOT AT ALL
3. WORRYING TOO MUCH ABOUT DIFFERENT THINGS: NOT AT ALL
5. BEING SO RESTLESS THAT IT IS HARD TO SIT STILL: NOT AT ALL
IF YOU CHECKED OFF ANY PROBLEMS ON THIS QUESTIONNAIRE, HOW DIFFICULT HAVE THESE PROBLEMS MADE IT FOR YOU TO DO YOUR WORK, TAKE CARE OF THINGS AT HOME, OR GET ALONG WITH OTHER PEOPLE: SOMEWHAT DIFFICULT
6. BECOMING EASILY ANNOYED OR IRRITABLE: SEVERAL DAYS
2. NOT BEING ABLE TO STOP OR CONTROL WORRYING: NOT AT ALL
GAD7 TOTAL SCORE: 2
1. FEELING NERVOUS, ANXIOUS, OR ON EDGE: SEVERAL DAYS

## 2018-07-25 ASSESSMENT — PATIENT HEALTH QUESTIONNAIRE - PHQ9: 5. POOR APPETITE OR OVEREATING: NOT AT ALL

## 2018-07-25 NOTE — PATIENT INSTRUCTIONS
PLEASE CALL TO SCHEDULE YOUR MAMMOGRAM  [facility] [phone]  [facility] [place] [phone]  [facility]   [phone]       Ideas...  [place] over Knives  Eat to Live/Eat for Health

## 2018-07-25 NOTE — MR AVS SNAPSHOT
After Visit Summary   7/25/2018    Cheyenne Kapoor    MRN: [id number]           Patient Information     Date Of Birth          1975        Visit Information        Provider Department      7/25/2018 1:00 PM Maris Garces MD [facility]        Today's Diagnoses     Weight gain    -  1    Moderate single current episode of major depressive disorder (H)          Care Instructions    PLEASE CALL TO SCHEDULE YOUR MAMMOGRAM  [facility] [place] [phone]  [facility] [place] [phone]  [place] [facility]   [phone]       Ideas...  [place] over Knives  Eat to Live/Eat for Health          Follow-ups after your visit        Who to contact     If you have questions or need follow up information about today's clinic visit or your schedule please contact [facility] directly at [phone].  Normal or non-critical lab and imaging results will be communicated to you by MyChart, letter or phone within 4 business days after the clinic has received the results. If you do not hear from us within 7 days, please contact the clinic through [employer] or phone. If you have a critical or abnormal lab result, we will notify you by phone as soon as possible.  Submit refill requests through [employer] or call your pharmacy and they will forward the refill request to us. Please allow 3 business days for your refill to be completed.          Additional Information About Your Visit        MyChart Information     [employer] gives you secure access to your electronic health record. If you see a primary care provider, you can also send messages to your care team and make appointments. If you have questions, please call your primary care clinic.  If you do not have a primary care provider, please call [phone] and they will assist you.        Care EveryWhere ID     This is your Care EveryWhere ID. This could be used by other  "organizations to access your [facility] medical records  [id number]        Your Vitals Were     Pulse Temperature Height Pulse Oximetry BMI (Body Mass Index)       55 98  F (36.7  C) (Oral) 5' 6\" (1.676 m) 97% 31.52 kg/m2        Blood Pressure from Last 3 Encounters:   07/25/18 106/70   03/09/18 104/72   11/10/17 107/69    Weight from Last 3 Encounters:   07/25/18 195 lb 4.8 oz (88.6 kg)   03/09/18 183 lb 4.8 oz (83.1 kg)   11/10/17 181 lb 6.4 oz (82.3 kg)              Today, you had the following     No orders found for display         Where to get your medicines      These medications were sent to [facility] [address] [place] [facility] [address]  [address] [place] [id number]     Phone:  [phone]     escitalopram 10 MG tablet          Primary Care Provider Office Phone # Fax #    Maris Garces [phone]       [address] [address]        Equal Access to Services     [facility] Center[facility]ELODY AH: Hadii aad ku hadasho Soomaali, waaxda luqadaha, qaybta kaalmada adeegyada, travis torres. So [facility] [phone].    ATENCIÓN: Si habla español, tiene a knight disposición servicios gratuitos de asistencia lingüística. Seleneame al [phone].    We comply with applicable federal civil rights laws and [place] laws. We do not discriminate on the basis of race, color, national origin, age, disability, sex, sexual orientation, or gender identity.            Thank you!     Thank you for choosing [facility]  for your care. Our goal is always to provide you with excellent care. Hearing back from our patients is one way we can continue to improve our services. Please take a few minutes to complete the written survey that you may receive in the mail after your visit with us. Thank you!             Your Updated Medication List - Protect others around you: Learn how to safely use, store and throw away your medicines at " www.disposemymeds.org.          This list is accurate as of 7/25/18  1:59 PM.  Always use your most recent med list.                   Brand Name Dispense Instructions for use Diagnosis    cholecalciferol 1000 UNIT tablet    vitamin D3    100 tablet    Take 1-2 tablets (1,000-2,000 Units) by mouth daily    Encounter for routine adult health examination without abnormal findings       escitalopram 10 MG tablet    LEXAPRO    90 tablet    Take 1 tablet (10 mg) by mouth daily    Moderate single current episode of major depressive disorder (H)       vitamin B complex with vitamin C Tabs tablet      Take 1 tablet by mouth daily

## 2018-07-25 NOTE — PROGRESS NOTES
SUBJECTIVE:   Cheyenne Kapoor is a 42 year old female who presents to clinic today for the following health issues:      Depression Followup    Status since last visit: Stable     See PHQ-9 for current symptoms.  Other associated symptoms: None    Complicating factors:   Significant life event:  No   Current substance abuse:  None  Anxiety or Panic symptoms:  No    PHQ-9 11/10/2017 3/9/2018 7/25/2018   Total Score 2 2 2   Q9: Suicide Ideation Not at all Not at all Not at all     Amount of exercise or physical activity: 3-4 x per week    Problems taking medications regularly: No    Medication side effects: none    Diet: regular (no restrictions)    Mood sx's-   Went out of town, forgot meds, 4-5 days- couple weeks ago.  While off the med, found she seemed more irritable, but no brain zaps.  New stressor- boss resigned, tomorrow is her last day, will be very stressful with the increased work.    Weight- is up, but was stable for first several months on the lexapro.  Feels like she has to think about it 24 hrs a day, lost the energy.  Still eats a ton of vegetables, roasted and raw, salad for lunch.    Lots of chicken.  Really cut back on red meat, ~1x/month.  Downfalls- sweets, sugar.  Summer- more ice cream    Trying to get to gym more often...  M/Th- 4 mile walk with neighbor.  Gym- 3-4x/wk, crossfit.    Mammogram discussion- would like to start screening after mother's recent ovarian mass issue.  At last visit, her mother was getting checked out for a lump.  Rare type of ovarian cancer- stromal tumor.  Removed, no mets, no other txt needed.  Thankful it was not worse.      Problem list and histories reviewed & adjusted, as indicated.  Additional history: as documented    Patient Active Problem List   Diagnosis     Seasonal allergic rhinitis     CARDIOVASCULAR SCREENING; LDL GOAL LESS THAN 160     Melanocytic nevus of left lower extremity     Vitamin D deficiency     Moderate single current episode of  "major depressive disorder (H)      Current Outpatient Prescriptions   Medication Sig Dispense Refill     cholecalciferol (VITAMIN D) 1000 UNIT tablet Take 1-2 tablets (1,000-2,000 Units) by mouth daily 100 tablet 3     escitalopram (LEXAPRO) 10 MG tablet Take 1 tablet (10 mg) by mouth daily 90 tablet 1     vitamin  B complex with vitamin C (VITAMIN  B COMPLEX) TABS Take 1 tablet by mouth daily       Allergies   Allergen Reactions     Amoxicillin Rash     Ceclor [Cefaclor Monohydrate] Rash     Penicillin G Rash     BP Readings from Last 3 Encounters:   07/25/18 106/70   03/09/18 104/72   11/10/17 107/69    Wt Readings from Last 3 Encounters:   07/25/18 195 lb 4.8 oz (88.6 kg)   03/09/18 183 lb 4.8 oz (83.1 kg)   11/10/17 181 lb 6.4 oz (82.3 kg)         Labs reviewed in EPIC    Reviewed and updated as needed this visit by clinical staff  Tobacco  Allergies  Meds  Problems  Med Hx  Surg Hx  Fam Hx  Soc Hx        Reviewed and updated as needed this visit by Provider  Allergies  Meds  Problems         ROS:  Constitutional, HEENT, cardiovascular, pulmonary, gi and gu systems are negative, except as otherwise noted.    OBJECTIVE:     /70  Pulse 55  Temp 98  F (36.7  C) (Oral)  Ht 5' 6\" (1.676 m)  Wt 195 lb 4.8 oz (88.6 kg)  SpO2 97%  BMI 31.52 kg/m2  Body mass index is 31.52 kg/(m^2).  GENERAL APPEARANCE: healthy, alert and no distress     EYES: sclera clear, EOMI     RESP: lungs clear to auscultation - no rales, rhonchi or wheezes     CV: regular rates and rhythm, normal S1 S2, no S3 or S4 and no murmur, click or rub      Ext: warm, dry, no edema       Psych: full range affect, normal speech and grooming, judgement and insight intact       ASSESSMENT/PLAN:       ICD-10-CM    1. Moderate single current episode of major depressive disorder (H) F32.1 escitalopram (LEXAPRO) 10 MG tablet   2. Weight gain R63.5        MDD- doing well on lexapro 10mg/d, no se's, would like to stay on it.    Wt gain- Has " gained ~15 lbs since last visit.  Found it was just too hard to stay so diligent, still eating well overall, but cheating more.  Didn't gain much if any weight the first several months she was on the lexapro (started in 9/17, and wt gain started after early 3/18 visit)- so not as suspicious it's the cause, but unable to r/o the possibility.  Discussed ideas as in pt instructions for options- Eat to Live or wt watchers potentially?  Pt will discuss with her - better if he's on board as well.    F/u in 6 months, sooner if concerns.    Maris Garces MD  [facility]      Patient Instructions   PLEASE CALL TO SCHEDULE YOUR MAMMOGRAM  [facility] [phone]  [facility] [phone]  [facility]   [phone]       Ideas...  [place] over Knives  Eat to Live/Eat for Health

## 2018-07-25 NOTE — NURSING NOTE
"Chief Complaint   Patient presents with     Depression     f/u     /70  Pulse 55  Temp 98  F (36.7  C) (Oral)  Ht 5' 6\" (1.676 m)  Wt 195 lb 4.8 oz (88.6 kg)  SpO2 97%  BMI 31.52 kg/m2 Estimated body mass index is 31.52 kg/(m^2) as calculated from the following:    Height as of this encounter: 5' 6\" (1.676 m).    Weight as of this encounter: 195 lb 4.8 oz (88.6 kg).        Health Maintenance due pending provider review:  NONE    n/a    Karolyn Lawson CMA  "

## 2018-07-26 ASSESSMENT — PATIENT HEALTH QUESTIONNAIRE - PHQ9: SUM OF ALL RESPONSES TO PHQ QUESTIONS 1-9: 2

## 2018-07-26 ASSESSMENT — ANXIETY QUESTIONNAIRES: GAD7 TOTAL SCORE: 2

## 2018-08-10 ENCOUNTER — HOSPITAL ENCOUNTER (OUTPATIENT)
Dept: MAMMOGRAPHY | Facility: CLINIC | Age: 43
Discharge: HOME | End: 2018-08-10
Attending: FAMILY MEDICINE | Admitting: FAMILY MEDICINE
Payer: COMMERCIAL

## 2018-08-10 DIAGNOSIS — Z12.39 BREAST CANCER SCREENING: ICD-10-CM

## 2018-08-10 PROCEDURE — 77063 BREAST TOMOSYNTHESIS BI: CPT

## 2019-03-12 ENCOUNTER — OFFICE VISIT (OUTPATIENT)
Dept: FAMILY MEDICINE | Facility: CLINIC | Age: 44
End: 2019-03-12
Payer: COMMERCIAL

## 2019-03-12 VITALS
HEART RATE: 61 BPM | RESPIRATION RATE: 16 BRPM | BODY MASS INDEX: 30.86 KG/M2 | HEIGHT: 66 IN | SYSTOLIC BLOOD PRESSURE: 101 MMHG | WEIGHT: 192 LBS | DIASTOLIC BLOOD PRESSURE: 70 MMHG | TEMPERATURE: 97.6 F | OXYGEN SATURATION: 100 %

## 2019-03-12 DIAGNOSIS — Z00.00 ENCOUNTER FOR ROUTINE ADULT HEALTH EXAMINATION WITHOUT ABNORMAL FINDINGS: Primary | ICD-10-CM

## 2019-03-12 DIAGNOSIS — Z30.09 COUNSELING FOR BIRTH CONTROL, INTRAUTERINE DEVICE: ICD-10-CM

## 2019-03-12 DIAGNOSIS — R63.4 WEIGHT LOSS: ICD-10-CM

## 2019-03-12 DIAGNOSIS — F32.1 MODERATE SINGLE CURRENT EPISODE OF MAJOR DEPRESSIVE DISORDER (H): ICD-10-CM

## 2019-03-12 DIAGNOSIS — Z13.6 CARDIOVASCULAR SCREENING; LDL GOAL LESS THAN 160: ICD-10-CM

## 2019-03-12 LAB
CHOLEST SERPL-MCNC: 186 MG/DL
GLUCOSE SERPL-MCNC: 84 MG/DL (ref 70–99)
HDLC SERPL-MCNC: 54 MG/DL
LDLC SERPL CALC-MCNC: 119 MG/DL
NONHDLC SERPL-MCNC: 132 MG/DL
TRIGL SERPL-MCNC: 65 MG/DL

## 2019-03-12 PROCEDURE — 99396 PREV VISIT EST AGE 40-64: CPT | Performed by: FAMILY MEDICINE

## 2019-03-12 PROCEDURE — 80061 LIPID PANEL: CPT | Performed by: FAMILY MEDICINE

## 2019-03-12 PROCEDURE — 36415 COLL VENOUS BLD VENIPUNCTURE: CPT | Performed by: FAMILY MEDICINE

## 2019-03-12 PROCEDURE — 82947 ASSAY GLUCOSE BLOOD QUANT: CPT | Performed by: FAMILY MEDICINE

## 2019-03-12 RX ORDER — ESCITALOPRAM OXALATE 10 MG/1
10 TABLET ORAL DAILY
Qty: 90 TABLET | Refills: 1 | Status: SHIPPED | OUTPATIENT
Start: 2019-03-12 | End: 2019-11-08

## 2019-03-12 ASSESSMENT — ANXIETY QUESTIONNAIRES
IF YOU CHECKED OFF ANY PROBLEMS ON THIS QUESTIONNAIRE, HOW DIFFICULT HAVE THESE PROBLEMS MADE IT FOR YOU TO DO YOUR WORK, TAKE CARE OF THINGS AT HOME, OR GET ALONG WITH OTHER PEOPLE: NOT DIFFICULT AT ALL
6. BECOMING EASILY ANNOYED OR IRRITABLE: SEVERAL DAYS
2. NOT BEING ABLE TO STOP OR CONTROL WORRYING: SEVERAL DAYS
1. FEELING NERVOUS, ANXIOUS, OR ON EDGE: SEVERAL DAYS
7. FEELING AFRAID AS IF SOMETHING AWFUL MIGHT HAPPEN: NOT AT ALL
3. WORRYING TOO MUCH ABOUT DIFFERENT THINGS: NOT AT ALL
5. BEING SO RESTLESS THAT IT IS HARD TO SIT STILL: NOT AT ALL
GAD7 TOTAL SCORE: 3

## 2019-03-12 ASSESSMENT — PATIENT HEALTH QUESTIONNAIRE - PHQ9
5. POOR APPETITE OR OVEREATING: NOT AT ALL
SUM OF ALL RESPONSES TO PHQ QUESTIONS 1-9: 3

## 2019-03-12 ASSESSMENT — MIFFLIN-ST. JEOR: SCORE: 1548.69

## 2019-03-12 NOTE — PROGRESS NOTES
SUBJECTIVE:   CC: Cheyenne Kapoor is an 43 year old woman who presents for preventive health visit.     Physical   Annual:     Getting at least 3 servings of Calcium per day:  Yes    Bi-annual eye exam:  Yes    Dental care twice a year:  Yes    Sleep apnea or symptoms of sleep apnea:  None    Diet:  Regular (no restrictions)    Frequency of exercise:  2-3 days/week    Duration of exercise:  30-45 minutes    Taking medications regularly:  Yes    Medication side effects:  Not applicable    Additional concerns today:  Yes    PHQ-2 Total Score: 0    Patient would like to discuss IUD states she is having bad headaches before her cycles starts every month and patient also has Wellness biometric form     ---Weight loss-  -Started wt watchers about 3 wks ago.  Just doing the tracking w/ jenny, very helpful.  Knows she should just continue it after losing the weight.  She feels like the current diet plan is very sustainable, and the jenny is cheap and easy to continue.  -She's down ~7 lbs already (had gone up further after her last appt here in ).  Has friend also in it- texts support.  Cut back on red meat ~1x/month now.  Goal is to get to 170 lbs (currently at 192 lbs)..  Exercise- still 3-4x/wk at [employer].  Once warmer, will walk with neighbor 3-4 miles.    --MDD-  -Lexapro- started in , total funk for a couple months prior to coming in.  Not wanting to get up, needing more sleep, short with kids.  Lots going on that time, job, MIL .  Residual sx's now- some days when she's anxious and not settled, little slow and sad.  Happens ~2x/month.  Unsure if cyclical if not, though she used to have cyclical mood sx's around periods (better with IUD).  -Prior to , no real issues with depression/anxiety.  Does worry more about her kids 11 and 7yo.  Fam h/o mdd/anxiety- none that she knows of...    --Hormonal sx's  Interested in getting the mirena put back in due to hormonal sx's.  Horrible HA's a day or  two prior to period, more mood sx's around her period.  HA's are usually rare for her, now for last six months, 1-2 days.  Bit more mood issues around period.  Periods pretty heavy, though light every third month.  Advil not helping HAs.  Wondering about IUD... Had one, mirena x ~5 yrs.     had vasectomy.  Removed IUD a couple yrs ago.          Today's PHQ-2 Score:   PHQ-2 ( 1999 Pf[employer]) 3/11/2019   Q1: Little interest or pleasure in doing things 0   Q2: Feeling down, depressed or hopeless 0   PHQ-2 Score 0   Q1: Little interest or pleasure in doing things Not at all   Q2: Feeling down, depressed or hopeless Not at all   PHQ-2 Score 0       Abuse: Current or Past(Physical, Sexual or Emotional)- No  Do you feel safe in your environment? Yes    Social History     Tobacco Use     Smoking status: Never Smoker     Smokeless tobacco: Never Used   Substance Use Topics     Alcohol use: Yes     Comment: 2 drinks monthly     Alcohol Use 3/11/2019   If you drink alcohol do you typically have greater than 3 drinks per day OR greater than 7 drinks per week? No   No flowsheet data found.    Reviewed orders with patient.  Reviewed health maintenance and updated orders accordingly - Yes    Labs reviewed in EPIC  BP Readings from Last 3 Encounters:   03/12/19 101/70   07/25/18 106/70   03/09/18 104/72    Wt Readings from Last 3 Encounters:   03/12/19 87.1 kg (192 lb)   07/25/18 88.6 kg (195 lb 4.8 oz)   03/09/18 83.1 kg (183 lb 4.8 oz)                  Patient Active Problem List   Diagnosis     Seasonal allergic rhinitis     CARDIOVASCULAR SCREENING; LDL GOAL LESS THAN 160     Melanocytic nevus of left lower extremity     Vitamin D deficiency     Moderate single current episode of major depressive disorder (H)     Past Surgical History:   Procedure Laterality Date     C IUD,MIRENA  7/11       Social History     Tobacco Use     Smoking status: Never Smoker     Smokeless tobacco: Never Used   Substance Use Topics     Alcohol  use: Yes     Comment: 2 drinks monthly     Family History   Problem Relation Age of Onset     Allergies Mother      Lipids Mother      Obesity Mother      Ovarian Cancer Mother         stromal, fine after removal     Hyperlipidemia Mother      Allergies Father      Lipids Father      Arthritis Father      Eye Disorder Father         macular degeneration     Hyperlipidemia Father      Allergies Sister      Alzheimer Disease Paternal Grandmother      Alzheimer Disease Maternal Grandfather      Arthritis Paternal Grandfather      Lipids Sister      Hyperlipidemia Sister          Current Outpatient Medications   Medication Sig Dispense Refill     cholecalciferol (VITAMIN D) 1000 UNIT tablet Take 1-2 tablets (1,000-2,000 Units) by mouth daily 100 tablet 3     escitalopram (LEXAPRO) 10 MG tablet Take 1 tablet (10 mg) by mouth daily 90 tablet 1     vitamin  B complex with vitamin C (VITAMIN  B COMPLEX) TABS Take 1 tablet by mouth daily       Allergies   Allergen Reactions     Amoxicillin Rash     Ceclor [Cefaclor Monohydrate] Rash     Penicillin G Rash     Recent Labs   Lab Test 03/12/19  0940 10/06/17 09/26/17  1219 02/21/17  0855   * 101  --  87   HDL 54 62  --  53   TRIG 65 42  --  68   TSH  --   --  1.12  --         Mammogram Screening: Patient under age 50, mutual decision reflected in health maintenance.      Pertinent mammograms are reviewed under the imaging tab.  History of abnormal Pap smear: NO - age 30-65 PAP every 5 years with negative HPV co-testing recommended  PAP / HPV Latest Ref Rng & Units 3/9/2018 11/8/2013 2/10/2012   PAP - NIL NIL NIL   HPV 16 DNA NEG:Negative Negative - -   HPV 18 DNA NEG:Negative Negative - -   OTHER HR HPV NEG:Negative Negative - -     Reviewed and updated as needed this visit by clinical staff         Reviewed and updated as needed this visit by Provider            Review of Systems  10 point ROS of systems including Constitutional, Eyes, Respiratory, Cardiovascular,  "Gastroenterology, Genitourinary, Integumentary, Muscularskeletal, Psychiatric were all negative except for pertinent positives noted in my HPI.       OBJECTIVE:   /70   Pulse 61   Temp 97.6  F (36.4  C) (Oral)   Resp 16   Ht 1.67 m (5' 5.75\")   Wt 87.1 kg (192 lb)   LMP 02/23/2019   SpO2 100%   BMI 31.23 kg/m    Physical Exam  GENERAL: healthy, alert and no distress  EYES: Eyes grossly normal to inspection, PERRL and conjunctivae and sclerae normal  HENT: ear canals and TM's normal, nose and mouth without ulcers or lesions  NECK: no adenopathy, no asymmetry, masses, or scars and thyroid normal to palpation  RESP: lungs clear to auscultation - no rales, rhonchi or wheezes  BREAST: normal without masses, tenderness or nipple discharge and no palpable axillary masses or adenopathy  CV: regular rate and rhythm, normal S1 S2, no S3 or S4, no murmur, click or rub, no peripheral edema and peripheral pulses strong  ABDOMEN: soft, nontender, no hepatosplenomegaly, no masses and bowel sounds normal  MS: no gross musculoskeletal defects noted, no edema  SKIN: no suspicious lesions or rashes  NEURO: Normal strength and tone, mentation intact and speech normal  PSYCH: mentation appears normal, affect normal/bright    Diagnostic Test Results:  Results for orders placed or performed in visit on 03/12/19   Lipid panel reflex to direct LDL Fasting   Result Value Ref Range    Cholesterol 186 <200 mg/dL    Triglycerides 65 <150 mg/dL    HDL Cholesterol 54 >49 mg/dL    LDL Cholesterol Calculated 119 (H) <100 mg/dL    Non HDL Cholesterol 132 (H) <130 mg/dL   Glucose   Result Value Ref Range    Glucose 84 70 - 99 mg/dL       ASSESSMENT/PLAN:       ICD-10-CM    1. Encounter for routine adult health examination without abnormal findings Z00.00 Glucose   2. Counseling for birth control, intrauterine device Z30.09    3. Weight loss R63.4    4. Moderate single current episode of major depressive disorder (H) F32.1 escitalopram " "(LEXAPRO) 10 MG tablet   5. CARDIOVASCULAR SCREENING; LDL GOAL LESS THAN 160 Z13.6 Lipid panel reflex to direct LDL Fasting     CPE- Discussed diet, calcium and exercise.  Went over Self Breast Exam.  Thin prep pap was not done.  Eyes and Teeth or UTD or recommended f/u.  No immunizations needed today.  See orders below for tests ordered and screening needed.      IUD counseling- worsening sx's around periods.  Doesn't need mirena for contraception, but would like to see if will help hormonal HA's, mood sx's, heavy periods, etc.  Realizes it may not help all these issues but would like to try.  Risks/benefits/se's discussed.  Pt will return for insertion. Liked mirena prior.    Wt loss - doing wt watchers, will cont, feels very sustainable.      MDD- has been on lexapro 10mg/d since 9/17, previous baseline mood good other than mild/mod cyclic sx's.  Will try IUD first, then get to stable baseline, and then can do TE appt if/when wanting to try going off lexapro.      Return in about 6 months (around 9/12/2019) for Depression follow-up, IUD insertion, call if lexapro wean (phone appt okay).        COUNSELING:  Reviewed preventive health counseling, as reflected in patient instructions  Special attention given to:        Regular exercise       Healthy diet/nutrition       Contraception       Osteoporosis Prevention/Bone Health       (Elyssa)menopause management    BP Readings from Last 1 Encounters:   07/25/18 106/70     Estimated body mass index is 31.52 kg/m  as calculated from the following:    Height as of 7/25/18: 1.676 m (5' 6\").    Weight as of 7/25/18: 88.6 kg (195 lb 4.8 oz).      Weight management plan: Discussed healthy diet and exercise guidelines     reports that  has never smoked. she has never used smokeless tobacco.      Counseling Resources:  ATP IV Guidelines  Pooled Cohorts Equation Calculator  Breast Cancer Risk Calculator  FRAX Risk Assessment  ICSI Preventive Guidelines  Dietary Guidelines for " Americans, 2010  USDA's MyPlate  ASA Prophylaxis  Lung CA Screening    Maris Garces MD  [facility]

## 2019-03-12 NOTE — NURSING NOTE
"Chief Complaint   Patient presents with     Physical     /70   Pulse 61   Temp 97.6  F (36.4  C) (Oral)   Resp 16   Ht 1.67 m (5' 5.75\")   Wt 87.1 kg (192 lb)   LMP 02/23/2019   SpO2 100%   BMI 31.23 kg/m   Estimated body mass index is 31.23 kg/m  as calculated from the following:    Height as of this encounter: 1.67 m (5' 5.75\").    Weight as of this encounter: 87.1 kg (192 lb).  bp completed using cuff size: regular       Health Maintenance addressed:  PHQ9    Possibly completing today per provider review.    Korin Rosenthal MA     "

## 2019-03-13 ASSESSMENT — ANXIETY QUESTIONNAIRES: GAD7 TOTAL SCORE: 3

## 2019-03-24 NOTE — RESULT ENCOUNTER NOTE
Here are your lab results from your recent visit...  -Your cholesterol panel looks pretty good with a decent (though higher than in the past) LDL (the bad cholesterol) and a good (but lower than in the past) HDL (the good cholesterol).  Hopefully your work on your diet will help the LDL.  Getting more aerobic exercise usually make the most impact on your HDL.      Please let me know if you have any questions.  Best,   Conrad Garces MD

## 2019-03-27 ENCOUNTER — TELEPHONE (OUTPATIENT)
Dept: FAMILY MEDICINE | Facility: CLINIC | Age: 44
End: 2019-03-27

## 2019-03-27 NOTE — TELEPHONE ENCOUNTER
Forms received from [employer]) for signature  Placed forms: on your desk  Forms need to be faxed to [phone]    Patient call? no  Copy sent to scanning? yes    All.ZBIGNIEW Morton

## 2019-04-05 ENCOUNTER — OFFICE VISIT (OUTPATIENT)
Dept: FAMILY MEDICINE | Facility: CLINIC | Age: 44
End: 2019-04-05
Payer: COMMERCIAL

## 2019-04-05 VITALS
SYSTOLIC BLOOD PRESSURE: 115 MMHG | TEMPERATURE: 97.9 F | HEART RATE: 56 BPM | DIASTOLIC BLOOD PRESSURE: 78 MMHG | WEIGHT: 190.4 LBS | HEIGHT: 66 IN | BODY MASS INDEX: 30.6 KG/M2 | OXYGEN SATURATION: 98 %

## 2019-04-05 DIAGNOSIS — Z01.812 PRE-PROCEDURE LAB EXAM: ICD-10-CM

## 2019-04-05 DIAGNOSIS — N94.3 PMS (PREMENSTRUAL SYNDROME): ICD-10-CM

## 2019-04-05 DIAGNOSIS — N92.0 MENORRHAGIA WITH REGULAR CYCLE: ICD-10-CM

## 2019-04-05 DIAGNOSIS — E75.5 XANTHOMA: ICD-10-CM

## 2019-04-05 DIAGNOSIS — Z30.430 ENCOUNTER FOR IUD INSERTION: Primary | ICD-10-CM

## 2019-04-05 LAB — HCG UR QL: NEGATIVE

## 2019-04-05 PROCEDURE — 81025 URINE PREGNANCY TEST: CPT | Performed by: FAMILY MEDICINE

## 2019-04-05 PROCEDURE — 58300 INSERT INTRAUTERINE DEVICE: CPT | Performed by: FAMILY MEDICINE

## 2019-04-05 PROCEDURE — 99213 OFFICE O/P EST LOW 20 MIN: CPT | Mod: 25 | Performed by: FAMILY MEDICINE

## 2019-04-05 ASSESSMENT — MIFFLIN-ST. JEOR: SCORE: 1541.43

## 2019-04-05 NOTE — PROGRESS NOTES
SUBJECTIVE:   Cheyenne Kapoor is a 43 year old female who presents to clinic today for the following health issues: IUD insertion, bumps on labia.    Interested in the mirena IUD- liked it a lot before.  Periods are getting worse progressively- usually 4-5 days, getting heavier.  Usually doesn't get headaches, now getting some 1-2 days prior.  Labile mood prior to her periods.   has vasectomy, doesn't need contraception, but felt great on it.  Previous mirena IUD removed ~2-3 yrs ago.    No se's on it.    Vaginal/labial area, noticed a couple bumps for the last few months.  Just wants to take a look today.      Cheyenne Kapoor is a 43 year old female who presents today requesting placement of an Mirena iud.  The patient meets and is agreeable to the following conditions:  She is parous.  She is not interested in conception in the near future.  She currently is in a stable, monogamous relationship.  There is no previous history of pelvic inflammatory disease.  There is no previous history of ectopic pregnancy.  She is willing to check monthly for the IUD string.  She is at least 8 weeks post-partum.  There is no history of unresolved abnormal uterine bleeding.  There is no history of an unresolved abnormal PAP smear.  She has no history of Nael's disease or an allergy to copper (for ParaGard).  She has no history of diabetes, AIDS, leukemia, IV drug use or chronic steroid use.  She is willing to return annually for PAP smears or as directed.  She denies the possibility of pregnancy.  Pregnancy test today is negative.    The following risks were discussed with the patient:  Possibility of pregnancy and ectopic pregnancy.  Possibility of pelvic inflammatory disease, particularly with new partners.  Risk of uterine perforation or IUD expulsion.  Possibility of difficult removal.  Spotting or heavy bleeding.  Cramping, pain or infection during or after insertion.    The patient was  "given patient information on the IUD and the patient education brochure from the .  The patient has given consent to proceed with placement of the IUD.  A written consent form is present in the chart.  She wishes to proceed.      Objective:  /78   Pulse 56   Temp 97.9  F (36.6  C) (Oral)   Ht 1.67 m (5' 5.75\")   Wt 86.4 kg (190 lb 6.4 oz)   LMP 03/16/2019 (Approximate)   SpO2 98%   BMI 30.97 kg/m     GEN: pleasant, alert, oriented, nad   Pelvic: normal external genitalia other than a few ~2-4mm superficial yellow ovals with no other concerning findings on labia majora bilaterally, normal vaginal mucosa and cervix, slight vaginal discharge, no sign of irritation or lesions        PROCEDURE:  Type of IUD: Mirena  Lot- [id number]  Exp- March 2021  NDC- 55607-976-70    The patient has taken 600mg of Ibuprofen prior to the procedure.  She is placed in a dorsal lithotomy potion and a pelvic exam is performed to determine the position of the uterus.  The cervix is identified and cleaned with betadine three times.  A single tooth tenaculum is applied to the anterior lip of the cervix for stabilization.  The uterus is sounded to 7.0 cm and removed. (Target sound depth is 6.5 cm to 8.5 cm.)  The IUD insertion tube is prepared to manufacturers recommendations and inserted into the uterus under sterile conditions to the sounding depth.   The arms of the IUD are then opened by withdrawing the insertion tube 2.0 cm while stabilizing the solid insertion miguel without difficulty.  The IUD string is then cut to 3.5-4 cm.    The patient tolerated this procedure without immediate complication.  The patient is to return or call immediately for any unexplained fever, abdominal or pelvic pain, excessive bleeding, possibility of pregnancy, foul-smelling discharge, sense that the IUD has been expelled.    Assessment/Plan:    ICD-10-CM    1. Encounter for IUD insertion Z30.430 levonorgestrel (MIRENA) 20 MCG/24HR IUD 20 " mcg     INSERTION INTRAUTERINE DEVICE   2. Menorrhagia with regular cycle N92.0 levonorgestrel (MIRENA) 20 MCG/24HR IUD 20 mcg     INSERTION INTRAUTERINE DEVICE   3. PMS (premenstrual syndrome) N94.3 levonorgestrel (MIRENA) 20 MCG/24HR IUD 20 mcg     INSERTION INTRAUTERINE DEVICE   4. Pre-procedure lab exam Z01.812 HCG Qual, Urine ([id number])     INSERTION INTRAUTERINE DEVICE   5. 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  Taken out 2-3 yrs ago when her  had vasectomy.  Now more for heavy periods and PMS sx's.  Discussed it will hopefully help, but can't be sure until tried.  Risks/benefits/se's discussed.    Labial xanthomas  We also discussed presence of xanthomas- few on each labia majora, ~2-4mm superficial yellow ovals with no other concerning findings.  Lipids done recently, with bit of an increase in LDL to the 130s, but otherwise fine.  She'll cont to work on wt loss- she's already lost 10 lbs since then with Weight Watchers, which is going well.    She will rtc in 6 wks for string check, sooner for any concerns.  She will check for strings prior to returning.    Maris Garces

## 2019-04-05 NOTE — NURSING NOTE
"Chief Complaint   Patient presents with     IUD     Insert      /78   Pulse 56   Temp 97.9  F (36.6  C) (Oral)   Ht 1.67 m (5' 5.75\")   Wt 86.4 kg (190 lb 6.4 oz)   LMP 03/16/2019 (Approximate)   SpO2 98%   BMI 30.97 kg/m   Estimated body mass index is 30.97 kg/m  as calculated from the following:    Height as of this encounter: 1.67 m (5' 5.75\").    Weight as of this encounter: 86.4 kg (190 lb 6.4 oz).  Medication Reconciliation: complete      Health Maintenance that is potentially due pending provider review:  NONE    n/a    ZBIGNIEW Hook  "

## 2019-04-30 DIAGNOSIS — F32.1 MODERATE SINGLE CURRENT EPISODE OF MAJOR DEPRESSIVE DISORDER (H): ICD-10-CM

## 2019-04-30 RX ORDER — ESCITALOPRAM OXALATE 10 MG/1
10 TABLET ORAL DAILY
Refills: 0
Start: 2019-04-30

## 2019-04-30 NOTE — TELEPHONE ENCOUNTER
"Last Written Prescription Date:  03/12/2019  Last Fill Quantity: 90,  # refills: 1   Last office visit: 4/5/2019 with prescribing provider:     Future Office Visit:  Requested Prescriptions   Pending Prescriptions Disp Refills     escitalopram (LEXAPRO) 10 MG tablet [Pharmacy Med Name: ESCITALOPRAM 10 MG TABLET] 90 tablet 0     Sig: TAKE 1 TABLET (10 MG) BY MOUTH DAILY       SSRIs Protocol Passed - 4/30/2019 10:19 AM        Passed - PHQ-9 score less than 5 in past 6 months     Please review last PHQ-9 score.           Passed - Medication is active on med list        Passed - Patient is age 18 or older        Passed - No active pregnancy on record        Passed - No positive pregnancy test in last 12 months        Passed - Recent (6 mo) or future (30 days) visit within the authorizing provider's specialty     Patient had office visit in the last 6 months or has a visit in the next 30 days with authorizing provider or within the authorizing provider's specialty.  See \"Patient Info\" tab in inbasket, or \"Choose Columns\" in Meds & Orders section of the refill encounter.            "

## 2019-08-30 ENCOUNTER — HOSPITAL ENCOUNTER (OUTPATIENT)
Dept: MAMMOGRAPHY | Facility: CLINIC | Age: 44
Discharge: HOME | End: 2019-08-30
Attending: FAMILY MEDICINE | Admitting: FAMILY MEDICINE
Payer: COMMERCIAL

## 2019-08-30 DIAGNOSIS — Z12.31 VISIT FOR SCREENING MAMMOGRAM: ICD-10-CM

## 2019-08-30 PROCEDURE — 77063 BREAST TOMOSYNTHESIS BI: CPT

## 2019-11-08 DIAGNOSIS — F32.1 MODERATE SINGLE CURRENT EPISODE OF MAJOR DEPRESSIVE DISORDER (H): ICD-10-CM

## 2019-11-08 RX ORDER — ESCITALOPRAM OXALATE 10 MG/1
TABLET ORAL
Qty: 30 TABLET | Refills: 0 | Status: SHIPPED | OUTPATIENT
Start: 2019-11-08 | End: 2020-08-18

## 2019-11-08 NOTE — TELEPHONE ENCOUNTER
"Requested Prescriptions   Pending Prescriptions Disp Refills     escitalopram (LEXAPRO) 10 MG tablet [Pharmacy Med Name: ESCITALOPRAM 10 MG TABLET] 90 tablet 1     Sig: TAKE 1 TABLET BY MOUTH EVERY DAY  Last Written Prescription Date:  3/12/19  Last Fill Quantity: 90 tab,  # refills: 1   Last office visit: 4/5/2019 with prescribing provider:  Dez   Future Office Visit:           SSRIs Protocol Failed - 11/8/2019  3:50 AM        Failed - PHQ-9 score less than 5 in past 6 months     Please review last PHQ-9 score.           Failed - Recent (6 mo) or future (30 days) visit within the authorizing provider's specialty     Patient had office visit in the last 6 months or has a visit in the next 30 days with authorizing provider or within the authorizing provider's specialty.  See \"Patient Info\" tab in inbasket, or \"Choose Columns\" in Meds & Orders section of the refill encounter.            Passed - Medication is active on med list        Passed - Patient is age 18 or older        Passed - No active pregnancy on record        Passed - No positive pregnancy test in last 12 months           "

## 2019-11-08 NOTE — TELEPHONE ENCOUNTER
Routing refill request to provider for review/approval because:  Due for apt- added in pharm comments to schedule.  Please authorize if appropriate.  Thanks,  Elisabeth Lora RN        Return in about 6 months (around 9/12/2019) for Depression follow-up, IUD insertion, call if lexapro wean (phone appt okay).

## 2020-03-01 ENCOUNTER — HEALTH MAINTENANCE LETTER (OUTPATIENT)
Age: 45
End: 2020-03-01

## 2020-04-06 ENCOUNTER — VIRTUAL VISIT (OUTPATIENT)
Dept: FAMILY MEDICINE | Facility: CLINIC | Age: 45
End: 2020-04-06
Payer: COMMERCIAL

## 2020-04-06 DIAGNOSIS — M54.50 ACUTE RIGHT-SIDED LOW BACK PAIN WITHOUT SCIATICA: Primary | ICD-10-CM

## 2020-04-06 PROCEDURE — 99213 OFFICE O/P EST LOW 20 MIN: CPT | Mod: TEL | Performed by: PHYSICIAN ASSISTANT

## 2020-04-06 NOTE — PROGRESS NOTES
"Subjective     Cheyenne Kapoor is a 44 year old female who is being evaluated via a billable telephone visit.      The patient has been notified of following:     \"This telephone visit will be conducted via a call between you and your physician/provider. We have found that certain health care needs can be provided without the need for a physical exam.  This service lets us provide the care you need with a short phone conversation.  If a prescription is necessary we can send it directly to your pharmacy.  If lab work is needed we can place an order for that and you can then stop by our lab to have the test done at a later time.    If during the course of the call the physician/provider feels a telephone visit is not appropriate, you will not be charged for this service.\"     Patient has given verbal consent for Telephone visit?  Yes    Cheyenne Kapoor complains of   Chief Complaint   Patient presents with     Musculoskeletal Problem     back spasms       ALLERGIES  Amoxicillin; Ceclor [cefaclor monohydrate]; and Penicillin g    Musculoskeletal problem/pain      Duration: 04/03/2020    Description  Location: lower back , leans more to the right side    Intensity:  severe    Accompanying signs and symptoms: sharp pain, radiation of pain    History  Previous similar problem: no   Previous evaluation:  none    Precipitating or alleviating factors:  Trauma or overuse: no   Aggravating factors include: standing and when patient moves    Therapies tried and outcome: heat and advil, te,porary relief    She is somewhat better today, first thing in the am, and middle of the night seem to be the worst.  She has been sleeping on  Her back, and she is usually a side sleeper.         BP Readings from Last 3 Encounters:   04/05/19 115/78   03/12/19 101/70   07/25/18 106/70    Wt Readings from Last 3 Encounters:   04/05/19 86.4 kg (190 lb 6.4 oz)   03/12/19 87.1 kg (192 lb)   07/25/18 88.6 kg (195 lb 4.8 " rahul)                    Reviewed and updated as needed this visit by Provider         Review of Systems   ROS COMP: Constitutional, HEENT, cardiovascular, pulmonary, gi and gu systems are negative, except as otherwise noted.       Objective   Reported vitals:  There were no vitals taken for this visit.   alert and no distress  Psych: Alert and oriented times 3; coherent speech, normal   rate and volume, able to articulate logical thoughts, able   to abstract reason, no tangential thoughts, no hallucinations   or delusions       Diagnostic Test Results:  Labs reviewed in Epic        Assessment/Plan:  1. Acute right-sided low back pain without sciatica  Discussed at home care, including active painless ROM exercises, focusing on extension.  Sleep position with pillow under knees or between if on side.  Will switch to ice from heat and naproxen BID.        Return in about 1 week (around 4/13/2020) for If symptoms persist or worsen.      Phone call duration:  9 minutes    Alfredo Poe PA-C

## 2020-05-04 ENCOUNTER — TELEPHONE (OUTPATIENT)
Dept: FAMILY MEDICINE | Facility: CLINIC | Age: 45
End: 2020-05-04

## 2020-05-04 NOTE — TELEPHONE ENCOUNTER
Summary:    Patient is due/failing the following:   Updated flu shot 11/11/2019    Type of outreach:    Action needed:     If need for provider review:    Please indicate OV, lab, MTM, or nurse appt if needed.  Indicate fasting or not fasting.                                                                                                                                          Bart Peñaloza ma

## 2020-07-06 ENCOUNTER — E-VISIT (OUTPATIENT)
Dept: FAMILY MEDICINE | Facility: CLINIC | Age: 45
End: 2020-07-06
Payer: COMMERCIAL

## 2020-07-06 DIAGNOSIS — Z53.9 ERRONEOUS ENCOUNTER--DISREGARD: Primary | ICD-10-CM

## 2020-07-06 ASSESSMENT — ANXIETY QUESTIONNAIRES
7. FEELING AFRAID AS IF SOMETHING AWFUL MIGHT HAPPEN: SEVERAL DAYS
5. BEING SO RESTLESS THAT IT IS HARD TO SIT STILL: NOT AT ALL
3. WORRYING TOO MUCH ABOUT DIFFERENT THINGS: SEVERAL DAYS
2. NOT BEING ABLE TO STOP OR CONTROL WORRYING: SEVERAL DAYS
1. FEELING NERVOUS, ANXIOUS, OR ON EDGE: MORE THAN HALF THE DAYS
GAD7 TOTAL SCORE: 9
6. BECOMING EASILY ANNOYED OR IRRITABLE: MORE THAN HALF THE DAYS
GAD7 TOTAL SCORE: 9
7. FEELING AFRAID AS IF SOMETHING AWFUL MIGHT HAPPEN: SEVERAL DAYS
4. TROUBLE RELAXING: MORE THAN HALF THE DAYS

## 2020-07-07 ASSESSMENT — ANXIETY QUESTIONNAIRES: GAD7 TOTAL SCORE: 9

## 2020-07-07 NOTE — TELEPHONE ENCOUNTER
Patient called and scheduled appts with    Patient scheduled telephone visit for 7/14/2020 and physical for 7/17/2020  Called pt to f/u on this   Patient doesn't want to wait 1.5 weeks to discuss anxiety and mood - needs help sooner  Is not available on 7/10/2020 when CW has openings  Pt states she leaves that AM for the weekend  Pt struggling with having to have two visits  Told pt at this time we can do phone visit first  Told her usually if meds are started she will need to f/u in 4-6 weeks   Would recommend she do f/u and physical mid/late August  Patient likes that plan better  Fixed appts  Pt will schedule physical and f/u after discussion with CW 7/14/2020  Mary MICHAEL RN

## 2020-07-14 ENCOUNTER — VIRTUAL VISIT (OUTPATIENT)
Dept: FAMILY MEDICINE | Facility: CLINIC | Age: 45
End: 2020-07-14
Payer: COMMERCIAL

## 2020-07-14 DIAGNOSIS — F41.9 ANXIETY: Primary | ICD-10-CM

## 2020-07-14 DIAGNOSIS — F33.0 MILD EPISODE OF RECURRENT MAJOR DEPRESSIVE DISORDER (H): ICD-10-CM

## 2020-07-14 PROCEDURE — 99214 OFFICE O/P EST MOD 30 MIN: CPT | Mod: GT | Performed by: FAMILY MEDICINE

## 2020-07-14 RX ORDER — CITALOPRAM HYDROBROMIDE 20 MG/1
20 TABLET ORAL DAILY
Qty: 60 TABLET | Refills: 0 | Status: SHIPPED | OUTPATIENT
Start: 2020-07-14 | End: 2020-08-18

## 2020-07-14 ASSESSMENT — PATIENT HEALTH QUESTIONNAIRE - PHQ9: SUM OF ALL RESPONSES TO PHQ QUESTIONS 1-9: 5

## 2020-07-14 NOTE — NURSING NOTE
"Chief Complaint   Patient presents with     Anxiety     initial There were no vitals taken for this visit. Estimated body mass index is 30.97 kg/m  as calculated from the following:    Height as of 4/5/19: 1.67 m (5' 5.75\").    Weight as of 4/5/19: 86.4 kg (190 lb 6.4 oz).  BP completed using cuff size: .  L  R arm      Health Maintenance that is potentially due pending provider review:      Bart Peñaloza ma  "

## 2020-07-14 NOTE — PROGRESS NOTES
"Cheyenne Kapoor is a 44 year old female who is being evaluated via a billable video visit.      The patient has been notified of following:     \"This video visit will be conducted via a call between you and your physician/provider. We have found that certain health care needs can be provided without the need for an in-person physical exam.  This service lets us provide the care you need with a video conversation.  If a prescription is necessary we can send it directly to your pharmacy.  If lab work is needed we can place an order for that and you can then stop by our lab to have the test done at a later time.    Video visits are billed at different rates depending on your insurance coverage.  Please reach out to your insurance provider with any questions.    If during the course of the call the physician/provider feels a video visit is not appropriate, you will not be charged for this service.\"    Patient has given verbal consent for Video visit? Yes  How would you like to obtain your AVS? [facility]  Patient would like the video invitation sent by:   Will anyone else be joining your video visit? No      Subjective     Cheyenne Kapoor is a 44 year old female who presents today via video visit for the following health issues:    HPI  Depression and Anxiety Follow-Up    How are you doing with your depression since your last visit? Worsened     How are you doing with your anxiety since your last visit?  Worsened     Are you having other symptoms that might be associated with depression or anxiety? No    Have you had a significant life event? No     Do you have any concerns with your use of alcohol or other drugs? No    Lexapro-   She had weaned down/off the lexapro about a year, after the discussion at 3/19 physical as she had been doing better.  Of the lexapro, she had been doing pretty well except well except the last few months.    Prior to going on lexapro, she had more depression sx's, sad, " motivation, hard to start the day.    Lately, her sx's are more related to anxiety.  Not as sad or trouble getting out of bed.  More just anxious.  She'll be okay with something, make a decision, then will completely second-guess hersef, and be in tears about it, so conflicted.  Sx's more frequent, 3-4x/wk.  Mood itself is pretty good comparitively.    Sleeping well, getting enough, though she'll wake up 1-2 times, mostly about anxious things, did she close or lock a door?      Stressors-   She doesn't do well being bored.  Intent for 2020 was to find a new job, because she is bored.  She's grateful for the job, but things are slow.  Kids are less active.  When she's bored, she overeats, and then is frustrated with gaining weight.      Weight - feels like she goes between ~175 lbs aind 190 lbs.  Now likely aroundd 190 lbs- doesn't want to get any heavier.  Was at the lower point of weight before starting the lexapro, but isn't sure if it was related to gaining wt or not.  Didn't lose weight after going off.    Feels like it's not affecting work, but likely home life.  has a harder time knowing how she'll react to things.      Social History     Tobacco Use     Smoking status: Never Smoker     Smokeless tobacco: Never Used   Substance Use Topics     Alcohol use: Yes     Comment: 2 drinks monthly     Drug use: No     PHQ 7/25/2018 3/12/2019 7/14/2020   PHQ-9 Total Score 2 3 5   Q9: Thoughts of better off dead/self-harm past 2 weeks Not at all Not at all Not at all     HEMANTH-7 SCORE 7/25/2018 3/12/2019 7/6/2020   Total Score - - 9 (mild anxiety)   Total Score 2 3 9       How many servings of fruits and vegetables do you eat daily?  2-3    On average, how many sweetened beverages do you drink each day (Examples: soda, juice, sweet tea, etc.  Do NOT count diet or artificially sweetened beverages)?   0    How many days per week do you exercise enough to make your heart beat faster? 4    How many minutes a day do you  exercise enough to make your heart beat faster? 30 - 60    How many days per week do you miss taking your medication? 0       Patient Active Problem List   Diagnosis     Seasonal allergic rhinitis     CARDIOVASCULAR SCREENING; LDL GOAL LESS THAN 160     Melanocytic nevus of left lower extremity     Vitamin D deficiency     Moderate single current episode of major depressive disorder (H)     Past Surgical History:   Procedure Laterality Date     C IUD,MIRENA  7/11       Social History     Tobacco Use     Smoking status: Never Smoker     Smokeless tobacco: Never Used   Substance Use Topics     Alcohol use: Yes     Comment: 2 drinks monthly     Family History   Problem Relation Age of Onset     Allergies Mother      Lipids Mother      Obesity Mother      Ovarian Cancer Mother         stromal, fine after removal     Hyperlipidemia Mother      Allergies Father      Lipids Father      Arthritis Father      Eye Disorder Father         macular degeneration     Hyperlipidemia Father      Allergies Sister      Alzheimer Disease Paternal Grandmother      Alzheimer Disease Maternal Grandfather      Arthritis Paternal Grandfather      Lipids Sister      Hyperlipidemia Sister          Current Outpatient Medications   Medication Sig Dispense Refill     citalopram (CELEXA) 20 MG tablet Take 1 tablet (20 mg) by mouth daily 60 tablet 0     cholecalciferol (VITAMIN D) 1000 UNIT tablet Take 1-2 tablets (1,000-2,000 Units) by mouth daily 100 tablet 3     escitalopram (LEXAPRO) 10 MG tablet TAKE 1 TABLET BY MOUTH EVERY DAY 30 tablet 0     vitamin B-Complex Take 1 tablet by mouth daily       Allergies   Allergen Reactions     Amoxicillin Rash     Ceclor [Cefaclor Monohydrate] Rash     Penicillin G Rash     Recent Labs   Lab Test 03/12/19  0940 10/06/17 09/26/17  1219 02/21/17  0855   * 101  --  87   HDL 54 62  --  53   TRIG 65 42  --  68   TSH  --   --  1.12  --       BP Readings from Last 3 Encounters:   04/05/19 115/78   03/12/19  101/70   07/25/18 106/70    Wt Readings from Last 3 Encounters:   04/05/19 86.4 kg (190 lb 6.4 oz)   03/12/19 87.1 kg (192 lb)   07/25/18 88.6 kg (195 lb 4.8 oz)           Reviewed and updated as needed this visit by Provider  Tobacco  Allergies  Meds  Problems  Med Hx  Surg Hx  Fam Hx         Review of Systems   Constitutional, HEENT, cardiovascular, pulmonary, gi and gu systems are negative, except as otherwise noted.      Objective             Physical Exam     GENERAL: Healthy, alert and no distress  EYES: Eyes grossly normal to inspection.  No discharge or erythema, or obvious scleral/conjunctival abnormalities.  RESP: No audible wheeze, cough, or visible cyanosis.  No visible retractions or increased work of breathing.    SKIN: Visible skin clear. No significant rash, abnormal pigmentation or lesions.  NEURO: Cranial nerves grossly intact.  Mentation and speech appropriate for age.  PSYCH: Mentation appears normal, affect normal/bright, judgement and insight intact, normal speech and appearance well-groomed.      Diagnostic Test Results:  Labs reviewed in Epic        Assessment & Plan       ICD-10-CM    1. Anxiety  F41.9 citalopram (CELEXA) 20 MG tablet   2. Mild episode of recurrent major depressive disorder (H)  F33.0 citalopram (CELEXA) 20 MG tablet     43yo with h/o depression, improved on lexapro.  Off for the past year and did well until the last few months when she's had increased anxiety.  Feels worse than when she had the depression sx's.  Mild insomnia sx's with it.  Mood okay, though PHQ-9 is still 7 today.  Discussed options, mosty citalopram and buspar (given potential weight gain on lexapro)- she would like to try citalopram.  Will start 20mg/d, and f/u in ~6 wks for physical and med check.       Return in about 6 weeks (around 8/25/2020) for Physical Exam, Anxiety follow-up.    Maris Garces MD  [facility]      Video-Visit Details    Type of service:  Video  Visit    Video Start Time: 7:41 AM  Video End Time:8:05 AM    Originating Location (pt. Location): Home    Distant Location (provider location):  [facility]     Platform used for Video Visit: AmWell    Return in about 6 weeks (around 8/25/2020) for Physical Exam, Anxiety follow-up.       Maris Garces MD

## 2020-08-18 ENCOUNTER — OFFICE VISIT (OUTPATIENT)
Dept: FAMILY MEDICINE | Facility: CLINIC | Age: 45
End: 2020-08-18
Payer: COMMERCIAL

## 2020-08-18 VITALS
WEIGHT: 197 LBS | BODY MASS INDEX: 31.66 KG/M2 | OXYGEN SATURATION: 97 % | HEIGHT: 66 IN | HEART RATE: 70 BPM | TEMPERATURE: 98.2 F | RESPIRATION RATE: 17 BRPM | DIASTOLIC BLOOD PRESSURE: 79 MMHG | SYSTOLIC BLOOD PRESSURE: 117 MMHG

## 2020-08-18 DIAGNOSIS — Z00.00 ROUTINE GENERAL MEDICAL EXAMINATION AT A HEALTH CARE FACILITY: Primary | ICD-10-CM

## 2020-08-18 DIAGNOSIS — F32.1 MODERATE SINGLE CURRENT EPISODE OF MAJOR DEPRESSIVE DISORDER (H): ICD-10-CM

## 2020-08-18 DIAGNOSIS — F33.0 MILD EPISODE OF RECURRENT MAJOR DEPRESSIVE DISORDER (H): ICD-10-CM

## 2020-08-18 DIAGNOSIS — G47.00 INSOMNIA, UNSPECIFIED TYPE: ICD-10-CM

## 2020-08-18 DIAGNOSIS — F41.9 ANXIETY: ICD-10-CM

## 2020-08-18 PROCEDURE — 99396 PREV VISIT EST AGE 40-64: CPT | Performed by: FAMILY MEDICINE

## 2020-08-18 RX ORDER — CITALOPRAM HYDROBROMIDE 20 MG/1
20 TABLET ORAL DAILY
Qty: 90 TABLET | Refills: 1 | Status: SHIPPED | OUTPATIENT
Start: 2020-08-18 | End: 2021-03-02

## 2020-08-18 ASSESSMENT — ANXIETY QUESTIONNAIRES
3. WORRYING TOO MUCH ABOUT DIFFERENT THINGS: NOT AT ALL
7. FEELING AFRAID AS IF SOMETHING AWFUL MIGHT HAPPEN: NOT AT ALL
6. BECOMING EASILY ANNOYED OR IRRITABLE: NOT AT ALL
IF YOU CHECKED OFF ANY PROBLEMS ON THIS QUESTIONNAIRE, HOW DIFFICULT HAVE THESE PROBLEMS MADE IT FOR YOU TO DO YOUR WORK, TAKE CARE OF THINGS AT HOME, OR GET ALONG WITH OTHER PEOPLE: NOT DIFFICULT AT ALL
2. NOT BEING ABLE TO STOP OR CONTROL WORRYING: NOT AT ALL
5. BEING SO RESTLESS THAT IT IS HARD TO SIT STILL: SEVERAL DAYS
GAD7 TOTAL SCORE: 1
1. FEELING NERVOUS, ANXIOUS, OR ON EDGE: NOT AT ALL

## 2020-08-18 ASSESSMENT — MIFFLIN-ST. JEOR: SCORE: 1574.31

## 2020-08-18 ASSESSMENT — PATIENT HEALTH QUESTIONNAIRE - PHQ9
5. POOR APPETITE OR OVEREATING: NOT AT ALL
SUM OF ALL RESPONSES TO PHQ QUESTIONS 1-9: 3

## 2020-08-18 NOTE — PROGRESS NOTES
SUBJECTIVE:   CC: Cheyenne Kapoor is an 44 year old woman who presents for preventive health visit.     Healthy Habits:     Getting at least 3 servings of Calcium per day:  Yes    Bi-annual eye exam:  Yes    Dental care twice a year:  Yes    Sleep apnea or symptoms of sleep apnea:  None    Diet:  Regular (no restrictions)    Frequency of exercise:  2-3 days/week    Duration of exercise:  45-60 minutes    Taking medications regularly:  Yes    Medication side effects:  None    PHQ-2 Total Score: 0    Additional concerns today:  No    Citalopram - started 7/14/20.  Had been on lexapro in past, though that was for more mood issues, this was more anxiety issues.    Anxiety is way better since getting on the citalopram- very helpful.  Now able to take things in, instead of explosively reacting.  Rational, constructive conversations with her .  Feels like her brain just slowed down a bit- in a good way.  'Not jumping off cliffs'  Lexapro- helped, but not as much. Felt like it was more for depression.    Does have insomnia se's on it, which is odd for her- usually sleeps very well.  Had HR the first week- that improved.  Sleep issues now- waking ~1:30-2, and up for about an hour.  Usually in bed at 10pm.   Trying to be off her phone ~1hr prior to bed.    Weight- at 197 lbs- last check in 4/20 at 190 lbs- thinks she went up a bit with COVID sheltering.  Weight prior to going on citalopram? Thinks it is the same as when she     IUD- doing well, minimal to no spotting/bleeding.      Today's PHQ-2 Score:   PHQ-2 ( 1999 Pfizer) 8/18/2020   Q1: Little interest or pleasure in doing things 0   Q2: Feeling down, depressed or hopeless 0   PHQ-2 Score 0   Q1: Little interest or pleasure in doing things Not at all   Q2: Feeling down, depressed or hopeless Not at all   PHQ-2 Score 0       Abuse: Current or Past(Physical, Sexual or Emotional)- No  Do you feel safe in your environment? Yes        Social History      Tobacco Use     Smoking status: Never Smoker     Smokeless tobacco: Never Used   Substance Use Topics     Alcohol use: Yes     Comment: 2 drinks monthly     If you drink alcohol do you typically have >3 drinks per day or >7 drinks per week? No    Alcohol Use 8/18/2020   Prescreen: >3 drinks/day or >7 drinks/week? No   Prescreen: >3 drinks/day or >7 drinks/week? -   No flowsheet data found.    Reviewed orders with patient.  Reviewed health maintenance and updated orders accordingly - Yes      Lab work is in process  Labs reviewed in EPIC  BP Readings from Last 3 Encounters:   08/18/20 117/79   04/05/19 115/78   03/12/19 101/70    Wt Readings from Last 3 Encounters:   08/18/20 89.4 kg (197 lb)   04/05/19 86.4 kg (190 lb 6.4 oz)   03/12/19 87.1 kg (192 lb)                  Patient Active Problem List   Diagnosis     Seasonal allergic rhinitis     CARDIOVASCULAR SCREENING; LDL GOAL LESS THAN 160     Melanocytic nevus of left lower extremity     Vitamin D deficiency     Moderate single current episode of major depressive disorder (H)     Past Surgical History:   Procedure Laterality Date     C IUD,MIRENA  7/11       Social History     Tobacco Use     Smoking status: Never Smoker     Smokeless tobacco: Never Used   Substance Use Topics     Alcohol use: Yes     Comment: 2 drinks monthly     Family History   Problem Relation Age of Onset     Allergies Mother      Lipids Mother      Obesity Mother      Ovarian Cancer Mother         stromal, fine after removal     Hyperlipidemia Mother      Other Cancer Mother      Allergies Father      Lipids Father      Arthritis Father      Eye Disorder Father         macular degeneration     Hyperlipidemia Father      Allergies Sister      Alzheimer Disease Paternal Grandmother      Alzheimer Disease Maternal Grandfather      Arthritis Paternal Grandfather      Lipids Sister      Hyperlipidemia Sister          Current Outpatient Medications   Medication Sig Dispense Refill      "cholecalciferol (VITAMIN D) 1000 UNIT tablet Take 1-2 tablets (1,000-2,000 Units) by mouth daily 100 tablet 3     citalopram (CELEXA) 20 MG tablet Take 1 tablet (20 mg) by mouth daily 90 tablet 1     Allergies   Allergen Reactions     Amoxicillin Rash     Ceclor [Cefaclor Monohydrate] Rash     Penicillin G Rash     Recent Labs   Lab Test 03/12/19  0940 10/06/17 09/26/17  1219 02/21/17  0855   * 101  --  87   HDL 54 62  --  53   TRIG 65 42  --  68   TSH  --   --  1.12  --         Mammogram not appropriate for this patient based on age.    Pertinent mammograms are reviewed under the imaging tab.  History of abnormal Pap smear: NO - age 30-65 PAP every 5 years with negative HPV co-testing recommended  PAP / HPV Latest Ref Rng & Units 3/9/2018 11/8/2013 2/10/2012   PAP - NIL NIL NIL   HPV 16 DNA NEG:Negative Negative - -   HPV 18 DNA NEG:Negative Negative - -   OTHER HR HPV NEG:Negative Negative - -     Reviewed and updated as needed this visit by clinical staff  Tobacco  Allergies  Meds  Problems  Med Hx  Surg Hx  Fam Hx  Soc Hx          Reviewed and updated as needed this visit by Provider  Tobacco  Allergies  Meds  Problems  Med Hx  Surg Hx  Fam Hx            Review of Systems  10 point ROS of systems including Constitutional, Eyes, Respiratory, Cardiovascular, Gastroenterology, Genitourinary, Integumentary, Muscularskeletal, Psychiatric were all negative except for pertinent positives noted in my HPI.       OBJECTIVE:   /79   Pulse 70   Temp 98.2  F (36.8  C) (Oral)   Resp 17   Ht 1.683 m (5' 6.25\")   Wt 89.4 kg (197 lb)   SpO2 97%   BMI 31.56 kg/m    Physical Exam  GENERAL: healthy, alert and no distress  EYES: Eyes grossly normal to inspection, PERRL and conjunctivae and sclerae normal  HENT: ear canals and TM's normal, nose and mouth without ulcers or lesions  NECK: no adenopathy, no asymmetry, masses, or scars and thyroid normal to palpation  RESP: lungs clear to auscultation - " "no rales, rhonchi or wheezes  BREAST: normal without masses, tenderness or nipple discharge and no palpable axillary masses or adenopathy  CV: regular rate and rhythm, normal S1 S2, no S3 or S4, no murmur, click or rub, no peripheral edema and peripheral pulses strong  ABDOMEN: soft, nontender, no hepatosplenomegaly, no masses and bowel sounds normal  MS: no gross musculoskeletal defects noted, no edema  SKIN: no suspicious lesions or rashes  NEURO: Normal strength and tone, mentation intact and speech normal  PSYCH: mentation appears normal, affect normal/bright    Diagnostic Test Results:  Labs reviewed in Epic    ASSESSMENT/PLAN:       ICD-10-CM    1. Routine general medical examination at a health care facility  Z00.00    2. Anxiety  F41.9 citalopram (CELEXA) 20 MG tablet   3. Moderate single current episode of major depressive disorder (H)  F32.1    4. Insomnia, unspecified type  G47.00    5. Mild episode of recurrent major depressive disorder (H)  F33.0 citalopram (CELEXA) 20 MG tablet       CPE- Discussed diet, calcium and exercise.  Thin prep pap was not done.  Eye and dental care UTD or recommended f/u.  No immunizations needed today.  See orders below for tests ordered and screening needed.        Anxiety- citalopram started at last virtual visit in 7/20- very helpful for her anxiety, much improved sx's, though she did start getting new insomnia sx's on it.  Feels it's worth it to continue on the citalopram though- insomnia se's are tolerable.  Will continue citalopram 20mg/day.  Discussed q6mo f/u, sooner if issues/concerns.    Insomnia with citalopram- can try taking at night.  If not helpful, can try melatonin 1-5mg, or magnesium 400mg at night.  [employer].    COUNSELING:  Reviewed preventive health counseling, as reflected in patient instructions    Estimated body mass index is 31.56 kg/m  as calculated from the following:    Height as of this encounter: 1.683 m (5' 6.25\").    Weight as of this " encounter: 89.4 kg (197 lb).    Weight management plan: Discussed healthy diet and exercise guidelines     reports that she has never smoked. She has never used smokeless tobacco.      Counseling Resources:  ATP IV Guidelines  Pooled Cohorts Equation Calculator  Breast Cancer Risk Calculator  FRAX Risk Assessment  ICSI Preventive Guidelines  Dietary Guidelines for Americans, 2010  USDA's MyPlate  ASA Prophylaxis  Lung CA Screening    Maris Garces MD  [facility]

## 2020-08-18 NOTE — PATIENT INSTRUCTIONS
Anxiety- citalopram working well- will cont 20mg/day.    Insomnia with citalopram- can try taking at night.  If not helpful, can try melatonin 1-5mg, or magnesium 400mg at night.  [employer].        Preventive Health Recommendations  Female Ages 40 to 49    Yearly exam:     See your health care provider every year in order to  1. Review health changes.   2. Discuss preventive care.    3. Review your medicines if your doctor prescribed any.      Get a Pap test every three years (unless you have an abnormal result and your provider advises testing more often).      If you get Pap tests with HPV test, you only need to test every 5 years, unless you have an abnormal result. You do not need a Pap test if your uterus was removed (hysterectomy) and you have not had cancer.      You should be tested each year for STDs (sexually transmitted diseases), if you're at risk.     Ask your doctor if you should have a mammogram.      Have a colonoscopy (test for colon cancer) if someone in your family has had colon cancer or polyps before age 50.       Have a cholesterol test every 5 years.       Have a diabetes test (fasting glucose) after age 45. If you are at risk for diabetes, you should have this test every 3 years.    Shots: Get a flu shot each year. Get a tetanus shot every 10 years.     Nutrition:     Eat at least 5 servings of fruits and vegetables each day.    Eat whole-grain bread, whole-wheat pasta and brown rice instead of white grains and rice.    Get adequate Calcium and Vitamin D.      Lifestyle    Exercise at least 150 minutes a week (an average of 30 minutes a day, 5 days a week). This will help you control your weight and prevent disease.    Limit alcohol to one drink per day.    No smoking.     Wear sunscreen to prevent skin cancer.    See your dentist every six months for an exam and cleaning.

## 2020-08-19 ASSESSMENT — ANXIETY QUESTIONNAIRES: GAD7 TOTAL SCORE: 1

## 2020-08-31 ENCOUNTER — HOSPITAL ENCOUNTER (OUTPATIENT)
Dept: MAMMOGRAPHY | Facility: CLINIC | Age: 45
Discharge: HOME | End: 2020-08-31
Attending: FAMILY MEDICINE | Admitting: FAMILY MEDICINE
Payer: COMMERCIAL

## 2020-08-31 DIAGNOSIS — Z12.31 VISIT FOR SCREENING MAMMOGRAM: ICD-10-CM

## 2020-08-31 PROCEDURE — 77067 SCR MAMMO BI INCL CAD: CPT

## 2021-03-02 DIAGNOSIS — F33.0 MILD EPISODE OF RECURRENT MAJOR DEPRESSIVE DISORDER (H): ICD-10-CM

## 2021-03-02 DIAGNOSIS — F41.9 ANXIETY: ICD-10-CM

## 2021-03-02 RX ORDER — CITALOPRAM HYDROBROMIDE 20 MG/1
TABLET ORAL
Qty: 30 TABLET | Refills: 0 | Status: SHIPPED | OUTPATIENT
Start: 2021-03-02 | End: 2021-03-29

## 2021-03-02 NOTE — TELEPHONE ENCOUNTER
Citalopram:    Medication is being filled for 1 time refill only due to:  Patient needs to be seen because due for 6 mo f/u.     Note from 8/18/2020 OV:  Anxiety- citalopram started at last virtual visit in 7/20- very helpful for her anxiety, much improved sx's, though she did start getting new insomnia sx's on it.  Feels it's worth it to continue on the citalopram though- insomnia se's are tolerable.  Will continue citalopram 20mg/day.  Discussed q6mo f/u, sooner if issues/concerns.    Randa Wilson RN

## 2021-03-27 DIAGNOSIS — F33.0 MILD EPISODE OF RECURRENT MAJOR DEPRESSIVE DISORDER (H): ICD-10-CM

## 2021-03-27 DIAGNOSIS — F41.9 ANXIETY: ICD-10-CM

## 2021-03-31 ENCOUNTER — TELEPHONE (OUTPATIENT)
Dept: FAMILY MEDICINE | Facility: CLINIC | Age: 46
End: 2021-03-31

## 2021-03-31 RX ORDER — CITALOPRAM HYDROBROMIDE 20 MG/1
TABLET ORAL
Qty: 30 TABLET | Refills: 0 | Status: SHIPPED | OUTPATIENT
Start: 2021-03-31 | End: 2021-04-23

## 2021-03-31 NOTE — TELEPHONE ENCOUNTER
Sent in one month supply, has upcoming appointment.    Next 5 appointments (look out 90 days)    Apr 23, 2021  1:00 PM  Office Visit with Maris Garces MD  [facility] ([facility] ) [address] [place] [id number] [phone]

## 2021-03-31 NOTE — TELEPHONE ENCOUNTER
Reason for Call:  Other prescription    Detailed comments: scheduled patient for medication refill appointment on 4/23/2021 soonest appointment available. Patient will need refill before next appointment.    Phone Number Patient can be reached at: Cell number on file:    Telephone Information:   Mobile [phone]       Best Time: anytime    Can we leave a detailed message on this number? Not Applicable    Call taken on 3/31/2021 at 4:28 PM by Joy Louis

## 2021-04-22 NOTE — PROGRESS NOTES
"    Assessment & Plan       ICD-10-CM    1. Mild episode of recurrent major depressive disorder (H)  F33.0 EMOTIONAL / BEHAVIORAL ASSESSMENT     citalopram (CELEXA) 20 MG tablet   2. Anxiety  F41.9 EMOTIONAL / BEHAVIORAL ASSESSMENT     citalopram (CELEXA) 20 MG tablet   3. Weight gain  R63.5       MDD/Anxiety-   Stressors, but feels stable/good, and that the citalopram at 20mg/d is working well and helpful.  No se's.      Weight gain- Feels the weight gain is related to the boredom during COVID (after appt, did track wt graph, and wt actually went up during the year she was off lexapro, and has been pretty stable since starting citalopram in 7/20).   Started NOOM with her  and friends a couple weeks ago, and is already down ~5 lbs and likes it.    Will cont citalopram and NOOM.  Rx sent x 6 months.    Return in about 6 months (around 10/23/2021) for Physical Exam, Depression follow-up, Fasting labs at appointment.      23 minutes spent on the date of the encounter doing chart review, patient visit and documentation        BMI:   Estimated body mass index is 31.08 kg/m  as calculated from the following:    Height as of this encounter: 1.683 m (5' 6.25\").    Weight as of this encounter: 88 kg (194 lb).   Weight management plan: Discussed healthy diet and exercise guidelines    Maris Garces MD  [facility]          Esteban Quinn is a 45 year old who presents for the following health issues- MDD/Anxiety    HPI     Answers for HPI/ROS submitted by the patient on 4/23/2021   If you checked off any problems, how difficult have these problems made it for you to do your work, take care of things at home, or get along with other people?: Not difficult at all  PHQ9 TOTAL SCORE: 2  HEMANTH 7 TOTAL SCORE: 2    Depression and Anxiety Follow-Up    How are you doing with your depression since your last visit? Improved     How are you doing with your anxiety since your last visit?  Improved "     Are you having other symptoms that might be associated with depression or anxiety? No    Have you had a significant life event? OTHER: got a puppy     Do you have any concerns with your use of alcohol or other drugs? No    Mood is fine.    [id number].  Feels much more balanced.  NO negative effects.      Started NOOM a couple weeks ago, along with , and some friends.  She thinks she gained weight out of boredom, not medication.  Thinks she gained more in beginning, when she was bored.  Already down ~5 lbs.  Logging, fruits/vegetables, low fat.  Lots of little classes.    Exercise- schedule is crazy.  Not like she used to, but doing a lot of walking.  Trying to figure out how to get back into exercising.    Cava-poo- 5 months, will get ~15 lbs.    Work- at home, now on [place] [employer], so very busy.       Social History     Tobacco Use     Smoking status: Never Smoker     Smokeless tobacco: Never Used   Substance Use Topics     Alcohol use: Yes     Comment: 2 drinks monthly     Drug use: No     PHQ 7/14/2020 8/18/2020 4/23/2021   PHQ-9 Total Score 5 3 2   Q9: Thoughts of better off dead/self-harm past 2 weeks Not at all Not at all Not at all     HEMANTH-7 SCORE 7/6/2020 8/18/2020 4/23/2021   Total Score 9 (mild anxiety) - 2 (minimal anxiety)   Total Score 9 1 2         How many servings of fruits and vegetables do you eat daily?  2-3    On average, how many sweetened beverages do you drink each day (Examples: soda, juice, sweet tea, etc.  Do NOT count diet or artificially sweetened beverages)?   0    How many days per week do you exercise enough to make your heart beat faster? 3 or less    How many minutes a day do you exercise enough to make your heart beat faster? 20 - 29    How many days per week do you miss taking your medication? 0        Review of Systems   Constitutional, HEENT, cardiovascular, pulmonary, gi and gu systems are negative, except as otherwise noted.      Objective    /74   Pulse 64   " Temp 97.2  F (36.2  C) (Tympanic)   Resp 16   Ht 1.683 m (5' 6.25\")   Wt 88 kg (194 lb)   SpO2 98%   BMI 31.08 kg/m    Body mass index is 31.08 kg/m .  Physical Exam   GENERAL APPEARANCE: healthy, alert and no distress     EYES: sclera clear, EOMI     RESP: lungs clear to auscultation - no rales, rhonchi or wheezes     CV: regular rates and rhythm, normal S1 S2, no S3 or S4 and no murmur, click or rub      Ext: warm, dry, no edema       Psych: full range affect, normal speech and grooming, judgement and insight intact             "

## 2021-04-23 ENCOUNTER — OFFICE VISIT (OUTPATIENT)
Dept: FAMILY MEDICINE | Facility: CLINIC | Age: 46
End: 2021-04-23
Payer: COMMERCIAL

## 2021-04-23 VITALS
RESPIRATION RATE: 16 BRPM | DIASTOLIC BLOOD PRESSURE: 74 MMHG | SYSTOLIC BLOOD PRESSURE: 109 MMHG | TEMPERATURE: 97.2 F | BODY MASS INDEX: 31.18 KG/M2 | WEIGHT: 194 LBS | OXYGEN SATURATION: 98 % | HEART RATE: 64 BPM | HEIGHT: 66 IN

## 2021-04-23 DIAGNOSIS — F32.1 MODERATE SINGLE CURRENT EPISODE OF MAJOR DEPRESSIVE DISORDER (H): ICD-10-CM

## 2021-04-23 DIAGNOSIS — R63.5 WEIGHT GAIN: ICD-10-CM

## 2021-04-23 DIAGNOSIS — F41.9 ANXIETY: ICD-10-CM

## 2021-04-23 DIAGNOSIS — F33.0 MILD EPISODE OF RECURRENT MAJOR DEPRESSIVE DISORDER (H): Primary | ICD-10-CM

## 2021-04-23 PROCEDURE — 96127 BRIEF EMOTIONAL/BEHAV ASSMT: CPT | Performed by: FAMILY MEDICINE

## 2021-04-23 PROCEDURE — 99213 OFFICE O/P EST LOW 20 MIN: CPT | Performed by: FAMILY MEDICINE

## 2021-04-23 RX ORDER — CITALOPRAM HYDROBROMIDE 20 MG/1
20 TABLET ORAL DAILY
Qty: 90 TABLET | Refills: 1 | Status: SHIPPED | OUTPATIENT
Start: 2021-04-23 | End: 2021-10-27

## 2021-04-23 ASSESSMENT — ANXIETY QUESTIONNAIRES
GAD7 TOTAL SCORE: 2
GAD7 TOTAL SCORE: 2
6. BECOMING EASILY ANNOYED OR IRRITABLE: NOT AT ALL
2. NOT BEING ABLE TO STOP OR CONTROL WORRYING: NOT AT ALL
5. BEING SO RESTLESS THAT IT IS HARD TO SIT STILL: NOT AT ALL
3. WORRYING TOO MUCH ABOUT DIFFERENT THINGS: SEVERAL DAYS
7. FEELING AFRAID AS IF SOMETHING AWFUL MIGHT HAPPEN: NOT AT ALL
4. TROUBLE RELAXING: NOT AT ALL
7. FEELING AFRAID AS IF SOMETHING AWFUL MIGHT HAPPEN: NOT AT ALL
GAD7 TOTAL SCORE: 2
1. FEELING NERVOUS, ANXIOUS, OR ON EDGE: SEVERAL DAYS

## 2021-04-23 ASSESSMENT — PATIENT HEALTH QUESTIONNAIRE - PHQ9
SUM OF ALL RESPONSES TO PHQ QUESTIONS 1-9: 2
SUM OF ALL RESPONSES TO PHQ QUESTIONS 1-9: 2
10. IF YOU CHECKED OFF ANY PROBLEMS, HOW DIFFICULT HAVE THESE PROBLEMS MADE IT FOR YOU TO DO YOUR WORK, TAKE CARE OF THINGS AT HOME, OR GET ALONG WITH OTHER PEOPLE: NOT DIFFICULT AT ALL

## 2021-04-23 ASSESSMENT — MIFFLIN-ST. JEOR: SCORE: 1555.7

## 2021-04-24 ASSESSMENT — ANXIETY QUESTIONNAIRES: GAD7 TOTAL SCORE: 2

## 2021-09-17 ENCOUNTER — HOSPITAL ENCOUNTER (OUTPATIENT)
Dept: MAMMOGRAPHY | Facility: CLINIC | Age: 46
Discharge: HOME | End: 2021-09-17
Attending: FAMILY MEDICINE | Admitting: FAMILY MEDICINE
Payer: COMMERCIAL

## 2021-09-17 DIAGNOSIS — Z12.31 VISIT FOR SCREENING MAMMOGRAM: ICD-10-CM

## 2021-09-17 PROCEDURE — 77063 BREAST TOMOSYNTHESIS BI: CPT

## 2021-10-02 ENCOUNTER — HEALTH MAINTENANCE LETTER (OUTPATIENT)
Age: 46
End: 2021-10-02

## 2021-10-26 DIAGNOSIS — F33.0 MILD EPISODE OF RECURRENT MAJOR DEPRESSIVE DISORDER (H): ICD-10-CM

## 2021-10-26 DIAGNOSIS — F41.9 ANXIETY: ICD-10-CM

## 2021-10-27 RX ORDER — CITALOPRAM HYDROBROMIDE 20 MG/1
TABLET ORAL
Qty: 30 TABLET | Refills: 0 | Status: SHIPPED | OUTPATIENT
Start: 2021-10-27 | End: 2021-11-12

## 2021-10-27 NOTE — TELEPHONE ENCOUNTER
Citalopram:    Medication is being filled for 1 time refill only due to:  Patient needs to be seen because due for physical.     Note from 4/23 OV:  Return in about 6 months (around 10/23/2021) for Physical Exam, Depression follow-up, Fasting labs at appointment.    Randa Wilson RN

## 2021-11-10 ENCOUNTER — MYC MEDICAL ADVICE (OUTPATIENT)
Dept: FAMILY MEDICINE | Facility: CLINIC | Age: 46
End: 2021-11-10
Payer: COMMERCIAL

## 2021-11-10 DIAGNOSIS — Z13.6 CARDIOVASCULAR SCREENING; LDL GOAL LESS THAN 160: Primary | ICD-10-CM

## 2021-11-11 NOTE — PROGRESS NOTES
SUBJECTIVE:   CC: Cheyenne Kapoor is an 46 year old woman who presents for preventive health visit.     Patient has been advised of split billing requirements and indicates understanding: Yes     Healthy Habits:    Getting at least 3 servings of Calcium per day:  Yes    Bi-annual eye exam:  Yes    Dental care twice a year:  Yes    Sleep apnea or symptoms of sleep apnea:  None    Diet:  Regular (no restrictions)    Frequency of exercise:  2-3 days/week    Duration of exercise:  30-45 minutes    Taking medications regularly:  Yes    Medication side effects:  None    PHQ-2 Total Score:    Additional concerns today:  No    MDD/Anxiety-   On citalopram- outlook is so much better, we'll figure things out,  would agree.  Prior to meds, had years of on/off sx's, situations piled on.  Feels like she would cont to benefit from staying on the meds.      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  Great change.  Fallen off the exercise train, though.  Gyms closing. Weight climbing.  They do have a peloton on order.  Might try NOOM again.      Had COVID, after vaccines (9/21)- felt like a sinus infection.  Just went to check prior to a neighborhood BBQ.  Youngest son also tested positive, no sx's.  Recovered well.        Biometric form- she took signed copy- will bring home and complete from [facility] results.    Today's PHQ-2 Score:   PHQ-2 ( 1999 Pfizer) 11/12/2021   Q1: Little interest or pleasure in doing things 0   Q2: Feeling down, depressed or hopeless 0   PHQ-2 Score 0   Q1: Little interest or pleasure in doing things Not at all   Q2: Feeling down, depressed or hopeless Not at all   PHQ-2 Score 0       Abuse: Current or Past (Physical, Sexual or Emotional) - No  Do you feel safe in your environment? Yes    Have you ever done Advance Care Planning?  (For example, a Health Directive, POLST, or a discussion with a medical provider or your loved ones about your wishes): Yes, patient states has an Advance Care Planning document and will bring a copy to the clinic.    Social History     Tobacco Use     Smoking status: Never Smoker     Smokeless tobacco: Never Used   Substance Use Topics     Alcohol use: Yes     Comment: 2 drinks monthly     If you drink alcohol do you typically have >3 drinks per day or >7 drinks per week? No    Alcohol Use 11/12/2021   Prescreen: >3 drinks/day or >7 drinks/week? No   Prescreen: >3 drinks/day or >7 drinks/week? -   No flowsheet data found.    Reviewed orders with patient.  Reviewed health maintenance and updated orders accordingly - Yes      Lab work is in process  Labs reviewed in EPIC  BP Readings from Last 3 Encounters:   11/12/21 108/73   04/23/21 109/74   08/18/20 117/79    Wt Readings from Last 3 Encounters:   11/12/21 90.7 kg (199 lb 14.4 oz)   04/23/21 88 kg (194 lb)   08/18/20 89.4 kg (197 lb)                  Patient Active Problem List   Diagnosis     Seasonal allergic rhinitis     CARDIOVASCULAR SCREENING; LDL GOAL LESS THAN 160     Melanocytic nevus of left lower extremity     Vitamin D deficiency     Moderate single current episode of major depressive disorder (H)     Past Surgical History:   Procedure Laterality Date     C IUD,MIRENA  7/11       Social History     Tobacco Use     Smoking status: Never Smoker     Smokeless tobacco: Never Used   Substance Use Topics     Alcohol use: Yes     Comment: 2 drinks monthly     Family History   Problem Relation Age of Onset     Allergies Mother      Lipids Mother      Obesity Mother      Ovarian Cancer Mother         stromal, fine after removal     Hyperlipidemia Mother      Other Cancer Mother      Allergies Father      Lipids Father      Arthritis Father      Eye Disorder Father         macular degeneration     Hyperlipidemia Father      Allergies Sister      Alzheimer  Disease Paternal Grandmother      Alzheimer Disease Maternal Grandfather      Arthritis Paternal Grandfather      Lipids Sister      Hyperlipidemia Sister          Current Outpatient Medications   Medication Sig Dispense Refill     citalopram (CELEXA) 20 MG tablet Take 1 tablet (20 mg) by mouth daily 90 tablet 1     cholecalciferol (VITAMIN D) 1000 UNIT tablet Take 1-2 tablets (1,000-2,000 Units) by mouth daily 100 tablet 3     Allergies   Allergen Reactions     Amoxicillin Rash     Ceclor [Cefaclor Monohydrate] Rash     Penicillin G Rash     Recent Labs   Lab Test 03/12/19  0940 10/06/17  0000 09/26/17  1219 02/21/17  0855   * 101  --  87   HDL 54 62  --  53   TRIG 65 42  --  68   TSH  --   --  1.12  --         Breast Cancer Screening:  Any new diagnosis of family breast, ovarian, or bowel cancer? No    FHS-7:   Breast CA Risk Assessment (FHS-7) 9/17/2021   Did any of your first-degree relatives have breast or ovarian cancer? No   Did any of your relatives have bilateral breast cancer? No   Did any man in your family have breast cancer? No   Did any woman in your family have breast and ovarian cancer? No   Did any woman in your family have breast cancer before age 50 y? No   Do you have 2 or more relatives with breast and/or ovarian cancer? No   Do you have 2 or more relatives with breast and/or bowel cancer? No       Mammogram Screening - Offered annual screening and updated Health Maintenance for mutual plan based on risk factor consideration    Pertinent mammograms are reviewed under the imaging tab.    History of abnormal Pap smear: NO - age 30-65 PAP every 5 years with negative HPV co-testing recommended  PAP / HPV Latest Ref Rng & Units 3/9/2018 11/8/2013 2/10/2012   PAP (Historical) - NIL NIL NIL   HPV16 NEG:Negative Negative - -   HPV18 NEG:Negative Negative - -   HRHPV NEG:Negative Negative - -     Reviewed and updated as needed this visit by clinical staff  Tobacco  Allergies  Meds  Problems   "Med Hx  Surg Hx  Fam Hx         Reviewed and updated as needed this visit by Provider  Tobacco  Allergies  Meds  Problems  Med Hx  Surg Hx  Fam Hx            Review of Systems   Constitutional: Negative for chills and fever.   HENT: Negative for congestion, ear pain, hearing loss and sore throat.    Eyes: Negative for pain and visual disturbance.   Respiratory: Negative for cough and shortness of breath.    Cardiovascular: Negative for chest pain, palpitations and peripheral edema.   Gastrointestinal: Negative for abdominal pain, constipation, diarrhea, heartburn, hematochezia and nausea.   Breasts:  Negative for tenderness, breast mass and discharge.   Genitourinary: Negative for dysuria, frequency, genital sores, hematuria, pelvic pain, urgency, vaginal bleeding and vaginal discharge.   Musculoskeletal: Negative for arthralgias, joint swelling and myalgias.   Skin: Negative for rash.   Neurological: Negative for dizziness, weakness, headaches and paresthesias.   Psychiatric/Behavioral: Negative for mood changes. The patient is not nervous/anxious.           OBJECTIVE:   /73   Pulse 73   Temp 97.3  F (36.3  C)   Ht 1.681 m (5' 6.2\")   Wt 90.7 kg (199 lb 14.4 oz)   SpO2 98%   BMI 32.07 kg/m    Physical Exam  GENERAL: healthy, alert and no distress  EYES: Eyes grossly normal to inspection, PERRL and conjunctivae and sclerae normal  HENT: ear canals and TM's normal, nose and mouth without ulcers or lesions  NECK: no adenopathy, no asymmetry, masses, or scars and thyroid normal to palpation  RESP: lungs clear to auscultation - no rales, rhonchi or wheezes  BREAST: normal without masses, tenderness or nipple discharge and no palpable axillary masses or adenopathy  CV: regular rate and rhythm, normal S1 S2, no S3 or S4, no murmur, click or rub, no peripheral edema and peripheral pulses strong  ABDOMEN: soft, nontender, no hepatosplenomegaly, no masses and bowel sounds normal  MS: no gross " "musculoskeletal defects noted, no edema  SKIN: no suspicious lesions or rashes  NEURO: Normal strength and tone, mentation intact and speech normal  PSYCH: mentation appears normal, affect normal/bright    Diagnostic Test Results:  Labs reviewed in Epic    ASSESSMENT/PLAN:       ICD-10-CM    1. Routine general medical examination at a health care facility  Z00.00    2. Anxiety  F41.9 citalopram (CELEXA) 20 MG tablet   3. Mild episode of recurrent major depressive disorder (H)  F33.0 citalopram (CELEXA) 20 MG tablet   4. Hearing loss of left ear, unspecified hearing loss type  H91.92 Otolaryngology Referral     CPE- We discussed ways to maintain a healthy lifestyle with sensible eating, regular exercise and self cares. We dicussed calcium and Vitamin D intake, vaccinations and preventive health screens.  See orders above for tests ordered and screening needed.  Thin prep pap was not done. No immunizations needed today.     Biometric forms reviewed and signed- she'll enter in lab results from those drawn earlier today from [facility].    MDD/anxiety-   Doing well on citalopram 20mg/d, feels much better on it, able to take things in stride, better outlook.  Will cont.  Cont q6mo f/u- can be video.  Refills sent.    Left ear hearing concerns- seems much worse on left, voices from phone sound muffled on that side, few months.  Will refer to ENT for further eval/txt.            Patient has been advised of split billing requirements and indicates understanding: Yes     COUNSELING:  Reviewed preventive health counseling, as reflected in patient instructions    Estimated body mass index is 32.07 kg/m  as calculated from the following:    Height as of this encounter: 1.681 m (5' 6.2\").    Weight as of this encounter: 90.7 kg (199 lb 14.4 oz).    Weight management plan: Discussed healthy diet and exercise guidelines    She reports that she has never smoked. She has never used smokeless tobacco.      Counseling Resources:  ATP IV " Guidelines  Pooled Cohorts Equation Calculator  Breast Cancer Risk Calculator  BRCA-Related Cancer Risk Assessment: FHS-7 Tool  FRAX Risk Assessment  ICSI Preventive Guidelines  Dietary Guidelines for Americans, 2010  USDA's MyPlate  ASA Prophylaxis  Lung CA Screening    Maris Garces MD  [facility]

## 2021-11-11 NOTE — TELEPHONE ENCOUNTER
CW,    Please see Play With Pictures / HangPic message below    Few order's T'd up  Not sure what you would like to check    Thanks,  Randa Wilsno RN

## 2021-11-12 ENCOUNTER — OFFICE VISIT (OUTPATIENT)
Dept: FAMILY MEDICINE | Facility: CLINIC | Age: 46
End: 2021-11-12
Payer: COMMERCIAL

## 2021-11-12 ENCOUNTER — LAB (OUTPATIENT)
Dept: LAB | Facility: CLINIC | Age: 46
End: 2021-11-12

## 2021-11-12 VITALS
DIASTOLIC BLOOD PRESSURE: 73 MMHG | WEIGHT: 199.9 LBS | HEART RATE: 73 BPM | HEIGHT: 66 IN | BODY MASS INDEX: 32.13 KG/M2 | TEMPERATURE: 97.3 F | OXYGEN SATURATION: 98 % | SYSTOLIC BLOOD PRESSURE: 108 MMHG

## 2021-11-12 DIAGNOSIS — F33.0 MILD EPISODE OF RECURRENT MAJOR DEPRESSIVE DISORDER (H): ICD-10-CM

## 2021-11-12 DIAGNOSIS — Z00.00 ROUTINE GENERAL MEDICAL EXAMINATION AT A HEALTH CARE FACILITY: Primary | ICD-10-CM

## 2021-11-12 DIAGNOSIS — H91.92 HEARING LOSS OF LEFT EAR, UNSPECIFIED HEARING LOSS TYPE: ICD-10-CM

## 2021-11-12 DIAGNOSIS — F41.9 ANXIETY: ICD-10-CM

## 2021-11-12 DIAGNOSIS — Z13.6 CARDIOVASCULAR SCREENING; LDL GOAL LESS THAN 160: ICD-10-CM

## 2021-11-12 LAB
ANION GAP SERPL CALCULATED.3IONS-SCNC: 4 MMOL/L (ref 3–14)
BUN SERPL-MCNC: 15 MG/DL (ref 7–30)
CALCIUM SERPL-MCNC: 9.3 MG/DL (ref 8.5–10.1)
CHLORIDE BLD-SCNC: 106 MMOL/L (ref 94–109)
CO2 SERPL-SCNC: 26 MMOL/L (ref 20–32)
CREAT SERPL-MCNC: 0.75 MG/DL (ref 0.52–1.04)
GFR SERPL CREATININE-BSD FRML MDRD: >90 ML/MIN/1.73M2
GLUCOSE BLD-MCNC: 85 MG/DL (ref 70–99)
POTASSIUM BLD-SCNC: 4 MMOL/L (ref 3.4–5.3)
SODIUM SERPL-SCNC: 136 MMOL/L (ref 133–144)

## 2021-11-12 PROCEDURE — 99396 PREV VISIT EST AGE 40-64: CPT | Mod: 25 | Performed by: FAMILY MEDICINE

## 2021-11-12 PROCEDURE — 90471 IMMUNIZATION ADMIN: CPT | Performed by: FAMILY MEDICINE

## 2021-11-12 PROCEDURE — 36415 COLL VENOUS BLD VENIPUNCTURE: CPT

## 2021-11-12 PROCEDURE — 80048 BASIC METABOLIC PNL TOTAL CA: CPT

## 2021-11-12 PROCEDURE — 80061 LIPID PANEL: CPT

## 2021-11-12 PROCEDURE — 90715 TDAP VACCINE 7 YRS/> IM: CPT | Performed by: FAMILY MEDICINE

## 2021-11-12 RX ORDER — CITALOPRAM HYDROBROMIDE 20 MG/1
20 TABLET ORAL DAILY
Qty: 90 TABLET | Refills: 1 | Status: SHIPPED | OUTPATIENT
Start: 2021-11-12 | End: 2021-11-26

## 2021-11-12 ASSESSMENT — ENCOUNTER SYMPTOMS
NAUSEA: 0
PARESTHESIAS: 0
COUGH: 0
EYE PAIN: 0
HEARTBURN: 0
FREQUENCY: 0
CONSTIPATION: 0
HEMATOCHEZIA: 0
BREAST MASS: 0
CHILLS: 0
ARTHRALGIAS: 0
HEADACHES: 0
JOINT SWELLING: 0
NERVOUS/ANXIOUS: 0
DIZZINESS: 0
DYSURIA: 0
WEAKNESS: 0
HEMATURIA: 0
SORE THROAT: 0
SHORTNESS OF BREATH: 0
MYALGIAS: 0
ABDOMINAL PAIN: 0
DIARRHEA: 0
PALPITATIONS: 0
FEVER: 0

## 2021-11-12 ASSESSMENT — ANXIETY QUESTIONNAIRES
GAD7 TOTAL SCORE: 0
3. WORRYING TOO MUCH ABOUT DIFFERENT THINGS: NOT AT ALL
2. NOT BEING ABLE TO STOP OR CONTROL WORRYING: NOT AT ALL
1. FEELING NERVOUS, ANXIOUS, OR ON EDGE: NOT AT ALL
7. FEELING AFRAID AS IF SOMETHING AWFUL MIGHT HAPPEN: NOT AT ALL
5. BEING SO RESTLESS THAT IT IS HARD TO SIT STILL: NOT AT ALL
6. BECOMING EASILY ANNOYED OR IRRITABLE: NOT AT ALL

## 2021-11-12 ASSESSMENT — PATIENT HEALTH QUESTIONNAIRE - PHQ9: 5. POOR APPETITE OR OVEREATING: NOT AT ALL

## 2021-11-12 ASSESSMENT — MIFFLIN-ST. JEOR: SCORE: 1576.67

## 2021-11-12 NOTE — NURSING NOTE
Prior to immunization administration, verified patients identity using patient s name and date of birth. Please see Immunization Activity for additional information.     Screening Questionnaire for Adult Immunization    Are you sick today?   No   Do you have allergies to medications, food, a vaccine component or latex?   No   Have you ever had a serious reaction after receiving a vaccination?   No   Do you have a long-term health problem with heart, lung, kidney, or metabolic disease (e.g., diabetes), asthma, a blood disorder, no spleen, complement component deficiency, a cochlear implant, or a spinal fluid leak?  Are you on long-term aspirin therapy?   No   Do you have cancer, leukemia, HIV/AIDS, or any other immune system problem?   No   Do you have a parent, brother, or sister with an immune system problem?   No   In the past 3 months, have you taken medications that affect  your immune system, such as prednisone, other steroids, or anticancer drugs; drugs for the treatment of rheumatoid arthritis, Crohn s disease, or psoriasis; or have you had radiation treatments?   No   Have you had a seizure, or a brain or other nervous system problem?   No   During the past year, have you received a transfusion of blood or blood    products, or been given immune (gamma) globulin or antiviral drug?   No   For women: Are you pregnant or is there a chance you could become       pregnant during the next month?   No   Have you received any vaccinations in the past 4 weeks?   No     Immunization questionnaire answers were all negative.        Per orders of Dr. Garces, injection of Adacel given by Kell Yanes MA. Patient instructed to remain in clinic for 15 minutes afterwards, and to report any adverse reaction to me immediately.       Screening performed by Kell Yanes MA on 11/12/2021 at 3:06 PM.  Kell Yanes MA on 11/12/2021 at 3:06 PM

## 2021-11-13 LAB
CHOLEST SERPL-MCNC: 201 MG/DL
FASTING STATUS PATIENT QL REPORTED: YES
HDLC SERPL-MCNC: 66 MG/DL
LDLC SERPL CALC-MCNC: 119 MG/DL
NONHDLC SERPL-MCNC: 135 MG/DL
TRIGL SERPL-MCNC: 81 MG/DL

## 2021-11-13 ASSESSMENT — ANXIETY QUESTIONNAIRES: GAD7 TOTAL SCORE: 0

## 2021-11-13 ASSESSMENT — PATIENT HEALTH QUESTIONNAIRE - PHQ9: SUM OF ALL RESPONSES TO PHQ QUESTIONS 1-9: 1

## 2021-11-18 NOTE — RESULT ENCOUNTER NOTE
Here are your lab results from your recent visit...  -Your basic metabolic panel (which includes electrolyte levels, blood sugar level and kidney function tests) looks normal.  -Your cholesterol panel looks stable with a pretty low LDL (the bad cholesterol) and a good (and improved) HDL (the good cholesterol).     Please let me know if you have any questions.  Best,   Conrad Garces MD

## 2022-05-18 NOTE — PROGRESS NOTES
"Cheyenne is a 46 year old who is being evaluated via a billable video visit.      How would you like to obtain your AVS? MyChart  If the video visit is dropped, the invitation should be resent by: Text to cell phone: [phone]  Will anyone else be joining your video visit? No        Assessment & Plan       ICD-10-CM    1. BMI 32.0-32.9,adult  Z68.32    2. Anxiety  F41.9 EMOTIONAL / BEHAVIORAL ASSESSMENT     citalopram (CELEXA) 20 MG tablet   3. Mild episode of recurrent major depressive disorder (H)  F33.0 EMOTIONAL / BEHAVIORAL ASSESSMENT     citalopram (CELEXA) 20 MG tablet      Anxiety/MDD-   Doing well on citalopram 20mg/d, really helps keep her anxiety under control.  Mood is good.  No recent stressors.  No se's.  Will cont citalopram at 20mg/d, refill sent.  BMI 32- wants to make some shifts- get on peloton more, pay more attention to what she's eating.    Follow-up in 6 months with physical (and fasting labs at visit).         BMI:   Estimated body mass index is 32.09 kg/m  as calculated from the following:    Height as of 11/12/21: 1.681 m (5' 6.2\").    Weight as of this encounter: 90.7 kg (200 lb).   Weight management plan: Discussed healthy diet and exercise guidelines        Return in about 6 months (around 11/24/2022) for Physical Exam, Fasting labs at appointment.    Maris Garces MD  [facility]            Subjective   Cheyenne is a 46 year old who presents for the following health issues - anxiety, mood    History of Present Illness       Mental Health Follow-up:  Patient presents to follow-up on Depression & Anxiety.Patient's depression since last visit has been:  Good  The patient is not having other symptoms associated with depression.  Patient's anxiety since last visit has been:  Good  The patient is not having other symptoms associated with anxiety.  Any significant life events: No  Patient is not feeling anxious or having panic attacks.  Patient has no concerns " about alcohol or drug use.    She eats 4 or more servings of fruits and vegetables daily.She consumes 0 sweetened beverage(s) daily.She exercises with enough effort to increase her heart rate 30 to 60 minutes per day.  She exercises with enough effort to increase her heart rate 3 or less days per week.   She is taking medications regularly.    Today's PHQ-9         PHQ-9 Total Score: 2    PHQ-9 Q9 Thoughts of better off dead/self-harm past 2 weeks :   Not at all    How difficult have these problems made it for you to do your work, take care of things at home, or get along with other people: Not difficult at all  Today's HEMANTH-7 Score: 5        Depression and Anxiety Follow-Up    How are you doing with your depression since your last visit? No change    How are you doing with your anxiety since your last visit?  No change    Are you having other symptoms that might be associated with depression or anxiety? No    Have you had a significant life event? No     Do you have any concerns with your use of alcohol or other drugs? No    Social History     Tobacco Use     Smoking status: Never Smoker     Smokeless tobacco: Never Used   Substance Use Topics     Alcohol use: Yes     Comment: 2 drinks monthly     Drug use: No     PHQ 4/23/2021 11/12/2021 5/24/2022   PHQ-9 Total Score 2 1 2   Q9: Thoughts of better off dead/self-harm past 2 weeks Not at all Not at all Not at all     HEMANTH-7 SCORE 4/23/2021 11/12/2021 5/24/2022   Total Score 2 (minimal anxiety) - 5 (mild anxiety)   Total Score 2 0 5       Feels the medication (citalopram) is really helpful.  She's a worrier in general, still has those moments, but she's able to control it.  Not debilitating.    Still working as well.  No se's.    Caught some sort of cold, bad sore throat.  Almost completely better now.    Weight- has to make a plan and shift her mindset and get going on it.  She knows she needs to do better at it.  Needs to hope on her peloton more often, walks  more.  Pay more attention to what she's eating.        How many servings of fruits and vegetables do you eat daily?  4 or more    On average, how many sweetened beverages do you drink each day (Examples: soda, juice, sweet tea, etc.  Do NOT count diet or artificially sweetened beverages)?   0    How many days per week do you exercise enough to make your heart beat faster? 3 or less    How many minutes a day do you exercise enough to make your heart beat faster? 30 - 60    How many days per week do you miss taking your medication? 0        Review of Systems   Constitutional, HEENT, cardiovascular, pulmonary, gi and gu systems are negative, except as otherwise noted.      Objective           Vitals:  No vitals were obtained today due to virtual visit.    Physical Exam   GENERAL: Healthy, alert and no distress  EYES: Eyes grossly normal to inspection.  No discharge or erythema, or obvious scleral/conjunctival abnormalities.  RESP: No audible wheeze, cough, or visible cyanosis.  No visible retractions or increased work of breathing.    SKIN: Visible skin clear. No significant rash, abnormal pigmentation or lesions.  NEURO: Cranial nerves grossly intact.  Mentation and speech appropriate for age.  PSYCH: Mentation appears normal, affect normal/bright, judgement and insight intact, normal speech and appearance well-groomed.            Video-Visit Details    Type of service:  Video Visit    Video End Time:9:11 AM    Originating Location (pt. Location): Home    Distant Location (provider location):  [facility]     Platform used for Video Visit: Drill Cycle

## 2022-05-24 ENCOUNTER — VIRTUAL VISIT (OUTPATIENT)
Dept: FAMILY MEDICINE | Facility: CLINIC | Age: 47
End: 2022-05-24
Payer: COMMERCIAL

## 2022-05-24 VITALS — WEIGHT: 200 LBS | BODY MASS INDEX: 32.09 KG/M2

## 2022-05-24 DIAGNOSIS — F41.9 ANXIETY: Primary | ICD-10-CM

## 2022-05-24 DIAGNOSIS — F33.0 MILD EPISODE OF RECURRENT MAJOR DEPRESSIVE DISORDER (H): ICD-10-CM

## 2022-05-24 PROCEDURE — 96127 BRIEF EMOTIONAL/BEHAV ASSMT: CPT | Mod: 95 | Performed by: FAMILY MEDICINE

## 2022-05-24 PROCEDURE — 99213 OFFICE O/P EST LOW 20 MIN: CPT | Mod: 95 | Performed by: FAMILY MEDICINE

## 2022-05-24 RX ORDER — CITALOPRAM HYDROBROMIDE 20 MG/1
20 TABLET ORAL DAILY
Qty: 90 TABLET | Refills: 1 | Status: SHIPPED | OUTPATIENT
Start: 2022-05-24 | End: 2022-12-07

## 2022-05-24 ASSESSMENT — ANXIETY QUESTIONNAIRES
8. IF YOU CHECKED OFF ANY PROBLEMS, HOW DIFFICULT HAVE THESE MADE IT FOR YOU TO DO YOUR WORK, TAKE CARE OF THINGS AT HOME, OR GET ALONG WITH OTHER PEOPLE?: NOT DIFFICULT AT ALL
GAD7 TOTAL SCORE: 5
2. NOT BEING ABLE TO STOP OR CONTROL WORRYING: SEVERAL DAYS
6. BECOMING EASILY ANNOYED OR IRRITABLE: SEVERAL DAYS
GAD7 TOTAL SCORE: 5
4. TROUBLE RELAXING: NOT AT ALL
3. WORRYING TOO MUCH ABOUT DIFFERENT THINGS: SEVERAL DAYS
GAD7 TOTAL SCORE: 5
7. FEELING AFRAID AS IF SOMETHING AWFUL MIGHT HAPPEN: SEVERAL DAYS
7. FEELING AFRAID AS IF SOMETHING AWFUL MIGHT HAPPEN: SEVERAL DAYS
5. BEING SO RESTLESS THAT IT IS HARD TO SIT STILL: NOT AT ALL
1. FEELING NERVOUS, ANXIOUS, OR ON EDGE: SEVERAL DAYS

## 2022-05-24 ASSESSMENT — PATIENT HEALTH QUESTIONNAIRE - PHQ9
SUM OF ALL RESPONSES TO PHQ QUESTIONS 1-9: 2
10. IF YOU CHECKED OFF ANY PROBLEMS, HOW DIFFICULT HAVE THESE PROBLEMS MADE IT FOR YOU TO DO YOUR WORK, TAKE CARE OF THINGS AT HOME, OR GET ALONG WITH OTHER PEOPLE: NOT DIFFICULT AT ALL
SUM OF ALL RESPONSES TO PHQ QUESTIONS 1-9: 2

## 2022-05-24 NOTE — NURSING NOTE
"Chief Complaint   Patient presents with     Recheck Medication     initial Wt 90.7 kg (200 lb)   BMI 32.09 kg/m   Estimated body mass index is 32.09 kg/m  as calculated from the following:    Height as of 11/12/21: 1.681 m (5' 6.2\").    Weight as of this encounter: 90.7 kg (200 lb).  BP completed using cuff size: .  L  R arm      Health Maintenance that is potentially due pending provider review:      Bart Peñaloza ma  "

## 2022-09-03 ENCOUNTER — HEALTH MAINTENANCE LETTER (OUTPATIENT)
Age: 47
End: 2022-09-03

## 2022-09-29 ENCOUNTER — HOSPITAL ENCOUNTER (OUTPATIENT)
Dept: MAMMOGRAPHY | Facility: CLINIC | Age: 47
Discharge: HOME | End: 2022-09-29
Attending: FAMILY MEDICINE | Admitting: FAMILY MEDICINE
Payer: COMMERCIAL

## 2022-09-29 DIAGNOSIS — Z12.31 VISIT FOR SCREENING MAMMOGRAM: ICD-10-CM

## 2022-09-29 PROCEDURE — 77067 SCR MAMMO BI INCL CAD: CPT

## 2022-10-20 ENCOUNTER — MYC MEDICAL ADVICE (OUTPATIENT)
Dept: FAMILY MEDICINE | Facility: CLINIC | Age: 47
End: 2022-10-20

## 2022-10-20 DIAGNOSIS — Z13.6 CARDIOVASCULAR SCREENING; LDL GOAL LESS THAN 160: Primary | ICD-10-CM

## 2022-12-02 ENCOUNTER — LAB (OUTPATIENT)
Dept: LAB | Facility: CLINIC | Age: 47
End: 2022-12-02
Payer: COMMERCIAL

## 2022-12-02 DIAGNOSIS — Z13.6 CARDIOVASCULAR SCREENING; LDL GOAL LESS THAN 160: ICD-10-CM

## 2022-12-02 LAB
ANION GAP SERPL CALCULATED.3IONS-SCNC: 9 MMOL/L (ref 7–15)
BUN SERPL-MCNC: 12.4 MG/DL (ref 6–20)
CALCIUM SERPL-MCNC: 9.2 MG/DL (ref 8.6–10)
CHLORIDE SERPL-SCNC: 103 MMOL/L (ref 98–107)
CHOLEST SERPL-MCNC: 172 MG/DL
CREAT SERPL-MCNC: 0.8 MG/DL (ref 0.51–0.95)
DEPRECATED HCO3 PLAS-SCNC: 26 MMOL/L (ref 22–29)
GFR SERPL CREATININE-BSD FRML MDRD: >90 ML/MIN/1.73M2
GLUCOSE SERPL-MCNC: 89 MG/DL (ref 70–99)
HDLC SERPL-MCNC: 52 MG/DL
LDLC SERPL CALC-MCNC: 108 MG/DL
NONHDLC SERPL-MCNC: 120 MG/DL
POTASSIUM SERPL-SCNC: 3.7 MMOL/L (ref 3.4–5.3)
SODIUM SERPL-SCNC: 138 MMOL/L (ref 136–145)
TRIGL SERPL-MCNC: 58 MG/DL

## 2022-12-02 PROCEDURE — 80061 LIPID PANEL: CPT

## 2022-12-02 PROCEDURE — 36415 COLL VENOUS BLD VENIPUNCTURE: CPT

## 2022-12-02 PROCEDURE — 80048 BASIC METABOLIC PNL TOTAL CA: CPT

## 2022-12-07 ENCOUNTER — OFFICE VISIT (OUTPATIENT)
Dept: FAMILY MEDICINE | Facility: CLINIC | Age: 47
End: 2022-12-07
Payer: COMMERCIAL

## 2022-12-07 VITALS
BODY MASS INDEX: 34.12 KG/M2 | HEART RATE: 82 BPM | OXYGEN SATURATION: 97 % | DIASTOLIC BLOOD PRESSURE: 71 MMHG | TEMPERATURE: 98.7 F | SYSTOLIC BLOOD PRESSURE: 105 MMHG | HEIGHT: 66 IN | WEIGHT: 212.3 LBS

## 2022-12-07 DIAGNOSIS — F41.9 ANXIETY: ICD-10-CM

## 2022-12-07 DIAGNOSIS — G43.009 MIGRAINE WITHOUT AURA AND WITHOUT STATUS MIGRAINOSUS, NOT INTRACTABLE: ICD-10-CM

## 2022-12-07 DIAGNOSIS — Z12.11 SCREEN FOR COLON CANCER: ICD-10-CM

## 2022-12-07 DIAGNOSIS — D22.72 MELANOCYTIC NEVUS OF LEFT LOWER EXTREMITY: ICD-10-CM

## 2022-12-07 DIAGNOSIS — Z30.431 INTRAUTERINE DEVICE SURVEILLANCE: ICD-10-CM

## 2022-12-07 DIAGNOSIS — Z12.4 CERVICAL CANCER SCREENING: ICD-10-CM

## 2022-12-07 DIAGNOSIS — F32.1 MODERATE SINGLE CURRENT EPISODE OF MAJOR DEPRESSIVE DISORDER (H): ICD-10-CM

## 2022-12-07 DIAGNOSIS — Z00.00 ROUTINE GENERAL MEDICAL EXAMINATION AT A HEALTH CARE FACILITY: Primary | ICD-10-CM

## 2022-12-07 PROCEDURE — G0145 SCR C/V CYTO,THINLAYER,RESCR: HCPCS | Performed by: FAMILY MEDICINE

## 2022-12-07 PROCEDURE — 87624 HPV HI-RISK TYP POOLED RSLT: CPT | Performed by: FAMILY MEDICINE

## 2022-12-07 PROCEDURE — 99396 PREV VISIT EST AGE 40-64: CPT | Performed by: FAMILY MEDICINE

## 2022-12-07 RX ORDER — CITALOPRAM HYDROBROMIDE 20 MG/1
20 TABLET ORAL DAILY
Qty: 90 TABLET | Refills: 1 | Status: SHIPPED | OUTPATIENT
Start: 2022-12-07 | End: 2023-06-07

## 2022-12-07 ASSESSMENT — ENCOUNTER SYMPTOMS
HEARTBURN: 0
NERVOUS/ANXIOUS: 1
CHILLS: 0
BREAST MASS: 0
SORE THROAT: 0
FREQUENCY: 0
FEVER: 0
COUGH: 0
HEMATOCHEZIA: 0
ARTHRALGIAS: 0
DYSURIA: 0
WEAKNESS: 0
MYALGIAS: 0
ABDOMINAL PAIN: 0
SHORTNESS OF BREATH: 0
DIZZINESS: 0
EYE PAIN: 0
PALPITATIONS: 0
PARESTHESIAS: 0
CONSTIPATION: 0
DIARRHEA: 0
HEADACHES: 1
NAUSEA: 0
HEMATURIA: 0
JOINT SWELLING: 0

## 2022-12-07 ASSESSMENT — ANXIETY QUESTIONNAIRES
IF YOU CHECKED OFF ANY PROBLEMS ON THIS QUESTIONNAIRE, HOW DIFFICULT HAVE THESE PROBLEMS MADE IT FOR YOU TO DO YOUR WORK, TAKE CARE OF THINGS AT HOME, OR GET ALONG WITH OTHER PEOPLE: NOT DIFFICULT AT ALL
5. BEING SO RESTLESS THAT IT IS HARD TO SIT STILL: NOT AT ALL
1. FEELING NERVOUS, ANXIOUS, OR ON EDGE: SEVERAL DAYS
GAD7 TOTAL SCORE: 3
6. BECOMING EASILY ANNOYED OR IRRITABLE: SEVERAL DAYS
GAD7 TOTAL SCORE: 3
7. FEELING AFRAID AS IF SOMETHING AWFUL MIGHT HAPPEN: NOT AT ALL
2. NOT BEING ABLE TO STOP OR CONTROL WORRYING: NOT AT ALL
3. WORRYING TOO MUCH ABOUT DIFFERENT THINGS: NOT AT ALL

## 2022-12-07 ASSESSMENT — PATIENT HEALTH QUESTIONNAIRE - PHQ9
5. POOR APPETITE OR OVEREATING: SEVERAL DAYS
10. IF YOU CHECKED OFF ANY PROBLEMS, HOW DIFFICULT HAVE THESE PROBLEMS MADE IT FOR YOU TO DO YOUR WORK, TAKE CARE OF THINGS AT HOME, OR GET ALONG WITH OTHER PEOPLE: NOT DIFFICULT AT ALL
SUM OF ALL RESPONSES TO PHQ QUESTIONS 1-9: 1
SUM OF ALL RESPONSES TO PHQ QUESTIONS 1-9: 1

## 2022-12-07 NOTE — PATIENT INSTRUCTIONS
Headache/migraine prevention/sleep help-  Magnesium 200-400mg        Preventive Health Recommendations  Female Ages 40 to 49    Yearly exam:   See your health care provider every year in order to  Review health changes.   Discuss preventive care.    Review your medicines if your doctor prescribed any.    Get a Pap test every three years (unless you have an abnormal result and your provider advises testing more often).    If you get Pap tests with HPV test, you only need to test every 5 years, unless you have an abnormal result. You do not need a Pap test if your uterus was removed (hysterectomy) and you have not had cancer.    You should be tested each year for STDs (sexually transmitted diseases), if you're at risk.   Ask your doctor if you should have a mammogram.    Have a colonoscopy (test for colon cancer) if someone in your family has had colon cancer or polyps before age 50.     Have a cholesterol test every 5 years.     Have a diabetes test (fasting glucose) after age 45. If you are at risk for diabetes, you should have this test every 3 years.    Shots: Get a flu shot each year. Get a tetanus shot every 10 years.     Nutrition:   Eat at least 5 servings of fruits and vegetables each day.  Eat whole-grain bread, whole-wheat pasta and brown rice instead of white grains and rice.  Get adequate Calcium and Vitamin D.      Lifestyle  Exercise at least 150 minutes a week (an average of 30 minutes a day, 5 days a week). This will help you control your weight and prevent disease.  Limit alcohol to one drink per day.  No smoking.   Wear sunscreen to prevent skin cancer.  See your dentist every six months for an exam and cleaning.

## 2022-12-07 NOTE — PROGRESS NOTES
SUBJECTIVE:   CC: Cheyenne is an 47 year old who presents for preventive health visit.        Patient has been advised of split billing requirements and indicates understanding: Yes     Healthy Habits:     Getting at least 3 servings of Calcium per day:  Yes    Bi-annual eye exam:  Yes    Dental care twice a year:  Yes    Sleep apnea or symptoms of sleep apnea:  None    Diet:  Other    Frequency of exercise:  2-3 days/week    Duration of exercise:  30-45 minutes    Taking medications regularly:  Yes    Medication side effects:  None    PHQ-2 Total Score: 0    Additional concerns today:  No    Started working with nutrition .  Macros- watching carbs, protein and fat.  Understanding about holidays.  Trying to help her increase her exercise.  Signed up for 6 months of her.  Can text anytime.  Going well, but slow.    Exercise before - long walks with the dog, 3-4x/wk.  No strength/peloton.      New job last Oct, commute is 5 minutes, prefers to go there, has her own space.    Citalopram 20mg/d.  Sometimes feels she wants more, sometimes feels like enough.  GAD7 today is 3.  PHQ9 is 1.      Answers for HPI/ROS submitted by the patient on 12/7/2022  If you checked off any problems, how difficult have these problems made it for you to do your work, take care of things at home, or get along with other people?: Not difficult at all  PHQ9 TOTAL SCORE: 1          Today's PHQ-2 Score:   PHQ-2 ( 1999 [employer]) 5/24/2022   Q1: Little interest or pleasure in doing things 0   Q2: Feeling down, depressed or hopeless 0   PHQ-2 Score 0   PHQ-2 Total Score (12-17 Years)- Positive if 3 or more points; Administer PHQ-A if positive -   Q1: Little interest or pleasure in doing things Not at all   Q2: Feeling down, depressed or hopeless Not at all   PHQ-2 Score 0           Social History     Tobacco Use     Smoking status: Never     Smokeless tobacco: Never   Substance Use Topics     Alcohol use: Yes     Comment: 2 drinks monthly      If you drink alcohol do you typically have >3 drinks per day or >7 drinks per week? No    Alcohol Use 11/12/2021   Prescreen: >3 drinks/day or >7 drinks/week? No   Prescreen: >3 drinks/day or >7 drinks/week? -       Reviewed orders with patient.  Reviewed health maintenance and updated orders accordingly - Yes      Lab work is in process  Labs reviewed in EPIC  BP Readings from Last 3 Encounters:   12/07/22 105/71   11/12/21 108/73   04/23/21 109/74    Wt Readings from Last 3 Encounters:   12/07/22 96.3 kg (212 lb 4.8 oz)   05/24/22 90.7 kg (200 lb)   11/12/21 90.7 kg (199 lb 14.4 oz)                  Patient Active Problem List   Diagnosis     Seasonal allergic rhinitis     CARDIOVASCULAR SCREENING; LDL GOAL LESS THAN 160     Melanocytic nevus of left lower extremity     Vitamin D deficiency     Moderate single current episode of major depressive disorder (H)     Intrauterine device surveillance     Migraine without aura and without status migrainosus, not intractable     Anxiety     Past Surgical History:   Procedure Laterality Date     C IUD,MIRENA  7/11       Social History     Tobacco Use     Smoking status: Never     Smokeless tobacco: Never   Substance Use Topics     Alcohol use: Yes     Comment: 2 drinks monthly     Family History   Problem Relation Age of Onset     Allergies Mother      Lipids Mother      Obesity Mother      Ovarian Cancer Mother         stromal, fine after removal     Hyperlipidemia Mother      Other Cancer Mother      Allergies Father      Lipids Father      Arthritis Father      Eye Disorder Father         macular degeneration     Hyperlipidemia Father      Allergies Sister      Alzheimer Disease Paternal Grandmother      Alzheimer Disease Maternal Grandfather      Arthritis Paternal Grandfather      Lipids Sister      Hyperlipidemia Sister          Current Outpatient Medications   Medication Sig Dispense Refill     cholecalciferol (VITAMIN D) 1000 UNIT tablet Take 1-2 tablets  (1,000-2,000 Units) by mouth daily 100 tablet 3     citalopram (CELEXA) 20 MG tablet Take 1 tablet (20 mg) by mouth daily 90 tablet 1     Allergies   Allergen Reactions     Amoxicillin Rash     Ceclor [Cefaclor Monohydrate] Rash     Penicillin G Rash     Recent Labs   Lab Test 12/02/22  0823 11/12/21  1009 03/12/19  0940 10/06/17  0000 09/26/17  1219   * 119* 119*   < >  --    HDL 52 66 54   < >  --    TRIG 58 81 65   < >  --    CR 0.80 0.75  --   --   --    GFRESTIMATED >90 >90  --   --   --    POTASSIUM 3.7 4.0  --   --   --    TSH  --   --   --   --  1.12    < > = values in this interval not displayed.        Breast Cancer Screening:    Breast CA Risk Assessment (FHS-7) 11/12/2021   Do you have a family history of breast, colon, or ovarian cancer? No / Unknown         Mammogram Screening: Recommended annual mammography  Pertinent mammograms are reviewed under the imaging tab.    History of abnormal Pap smear: NO - age 30-65 PAP every 5 years with negative HPV co-testing recommended  PAP / HPV Latest Ref Rng & Units 3/9/2018 11/8/2013 2/10/2012   PAP (Historical) - NIL NIL NIL   HPV16 NEG:Negative Negative - -   HPV18 NEG:Negative Negative - -   HRHPV NEG:Negative Negative - -     Reviewed and updated as needed this visit by clinical staff                  Reviewed and updated as needed this visit by Provider                     Review of Systems   Constitutional: Negative for chills and fever.   HENT: Negative for congestion, ear pain, hearing loss and sore throat.    Eyes: Negative for pain and visual disturbance.   Respiratory: Negative for cough and shortness of breath.    Cardiovascular: Negative for chest pain, palpitations and peripheral edema.   Gastrointestinal: Negative for abdominal pain, constipation, diarrhea, heartburn, hematochezia and nausea.   Breasts:  Negative for tenderness, breast mass and discharge.   Genitourinary: Negative for dysuria, frequency, genital sores, hematuria, pelvic  "pain, urgency, vaginal bleeding and vaginal discharge.   Musculoskeletal: Negative for arthralgias, joint swelling and myalgias.   Skin: Negative for rash.   Neurological: Positive for headaches. Negative for dizziness, weakness and paresthesias.   Psychiatric/Behavioral: Negative for mood changes. The patient is nervous/anxious.         OBJECTIVE:   /71   Pulse 82   Temp 98.7  F (37.1  C) (Temporal)   Ht 1.679 m (5' 6.1\")   Wt 96.3 kg (212 lb 4.8 oz)   SpO2 97%   BMI 34.16 kg/m    Physical Exam  GENERAL: healthy, alert and no distress  EYES: Eyes grossly normal to inspection, PERRL and conjunctivae and sclerae normal  HENT: ear canals and TM's normal, nose and mouth without ulcers or lesions  NECK: no adenopathy, no asymmetry, masses, or scars and thyroid normal to palpation  RESP: lungs clear to auscultation - no rales, rhonchi or wheezes  BREAST: normal without masses, tenderness or nipple discharge and no palpable axillary masses or adenopathy  CV: regular rate and rhythm, normal S1 S2, no S3 or S4, no murmur, click or rub, no peripheral edema and peripheral pulses strong  ABDOMEN: soft, nontender, no hepatosplenomegaly, no masses and bowel sounds normal   (female): normal female external genitalia, normal urethral meatus, vaginal mucosa pink, moist, well rugated, and normal cervix/adnexa/uterus without masses or discharge, IUD strings noted, 2-3 cm extension from cervical os  MS: no gross musculoskeletal defects noted, no edema  SKIN: no suspicious lesions or rashes  NEURO: Normal strength and tone, mentation intact and speech normal  PSYCH: mentation appears normal, affect normal/bright    Diagnostic Test Results:  Labs reviewed in Epic      ASSESSMENT/PLAN:       ICD-10-CM    1. Routine general medical examination at a health care facility  Z00.00       2. Moderate single current episode of major depressive disorder (H)  F32.1 citalopram (CELEXA) 20 MG tablet      3. Anxiety  F41.9 citalopram " (CELEXA) 20 MG tablet      4. BMI 34.0-34.9,adult  Z68.34       5. Migraine without aura and without status migrainosus, not intractable  G43.009       6. Intrauterine device surveillance  Z30.431       7. Melanocytic nevus of left lower extremity  D22.72       8. Screen for colon cancer  Z12.11 Colonoscopy Screening  Referral      9. Cervical cancer screening  Z12.4 Pap Screen with HPV - recommended age 30 - 65 years        CPE- Discussed diet, calcium and exercise.  Thin prep pap was done.  Eye and dental care UTD or recommended f/u.    -Rec getting COVID19 bivalent immunization soon.  See orders below for tests ordered and screening needed.    -Reviewed recent fasting labs- look good, work biometric form completed.  -Referred for colonoscopy.    MDD/Anxiety- mixed sx's, but overall okay on the citalopram 20mg/d.  Depends on stressors, currently stressed as her son is stressed at school after getting sick/behind.  Working with nutritionist the last 3 wks, increasing exercise, working on macros, feels it's helpful, hopeful she can exercise more regularly.    Will continue citalopram at 20mg/d for now, and work on increasing exercise.  Cont q6mo follow-up - sooner if worsening sx's.    BMI 34- wt up some, working with nutritionist as above.  Fasting labs looked good, with lower LDL this time.    Migraines- stable, about every other month, tends more around periods.  Will try magnesium at night for prevention to see if helpful.    IUD- mirena - her second, placed in '19, working well, but usually has a period needing tampons x 1 day.  Strings seen on exam for pap today.    H/o melanocytic mole- rec follow-up with derm.    Return in about 6 months (around 6/7/2023) for Depression, Anxiety.                    Patient has been advised of split billing requirements and indicates understanding: Yes      COUNSELING:  Reviewed preventive health counseling, as reflected in patient instructions        She reports  that she has never smoked. She has never used smokeless tobacco.      Maris Garces MD  [facility]

## 2022-12-09 LAB
BKR LAB AP GYN ADEQUACY: NORMAL
BKR LAB AP GYN INTERPRETATION: NORMAL
BKR LAB AP HPV REFLEX: NORMAL
BKR LAB AP PREVIOUS ABNORMAL: NORMAL
PATH REPORT.COMMENTS IMP SPEC: NORMAL
PATH REPORT.COMMENTS IMP SPEC: NORMAL
PATH REPORT.RELEVANT HX SPEC: NORMAL

## 2022-12-13 LAB
HUMAN PAPILLOMA VIRUS 16 DNA: NEGATIVE
HUMAN PAPILLOMA VIRUS 18 DNA: NEGATIVE
HUMAN PAPILLOMA VIRUS FINAL DIAGNOSIS: NORMAL
HUMAN PAPILLOMA VIRUS OTHER HR: NEGATIVE

## 2023-03-31 ENCOUNTER — TRANSFERRED RECORDS (OUTPATIENT)
Dept: HEALTH INFORMATION MANAGEMENT | Facility: CLINIC | Age: 48
End: 2023-03-31
Payer: COMMERCIAL

## 2023-04-20 PROBLEM — D12.6 SERRATED POLYPOSIS SYNDROME: Status: ACTIVE | Noted: 2023-04-20

## 2023-04-21 ENCOUNTER — VIRTUAL VISIT (OUTPATIENT)
Dept: FAMILY MEDICINE | Facility: CLINIC | Age: 48
End: 2023-04-21
Payer: COMMERCIAL

## 2023-04-21 DIAGNOSIS — F32.1 MODERATE SINGLE CURRENT EPISODE OF MAJOR DEPRESSIVE DISORDER (H): Primary | ICD-10-CM

## 2023-04-21 DIAGNOSIS — F41.9 ANXIETY: ICD-10-CM

## 2023-04-21 PROCEDURE — 99214 OFFICE O/P EST MOD 30 MIN: CPT | Mod: VID | Performed by: FAMILY MEDICINE

## 2023-04-21 ASSESSMENT — ANXIETY QUESTIONNAIRES
1. FEELING NERVOUS, ANXIOUS, OR ON EDGE: MORE THAN HALF THE DAYS
IF YOU CHECKED OFF ANY PROBLEMS ON THIS QUESTIONNAIRE, HOW DIFFICULT HAVE THESE PROBLEMS MADE IT FOR YOU TO DO YOUR WORK, TAKE CARE OF THINGS AT HOME, OR GET ALONG WITH OTHER PEOPLE: SOMEWHAT DIFFICULT
GAD7 TOTAL SCORE: 6
5. BEING SO RESTLESS THAT IT IS HARD TO SIT STILL: NOT AT ALL
3. WORRYING TOO MUCH ABOUT DIFFERENT THINGS: SEVERAL DAYS
6. BECOMING EASILY ANNOYED OR IRRITABLE: MORE THAN HALF THE DAYS
2. NOT BEING ABLE TO STOP OR CONTROL WORRYING: SEVERAL DAYS
7. FEELING AFRAID AS IF SOMETHING AWFUL MIGHT HAPPEN: NOT AT ALL
GAD7 TOTAL SCORE: 6

## 2023-04-21 ASSESSMENT — PATIENT HEALTH QUESTIONNAIRE - PHQ9
5. POOR APPETITE OR OVEREATING: NOT AT ALL
SUM OF ALL RESPONSES TO PHQ QUESTIONS 1-9: 4

## 2023-04-21 NOTE — PROGRESS NOTES
"Cheyenne is a 47 year old who is being evaluated via a billable video visit.      How would you like to obtain your AVS? MyChart  If the video visit is dropped, the invitation should be resent by: Text to cell phone: [phone]  Will anyone else be joining your video visit? No        Assessment & Plan       ICD-10-CM    1. Moderate single current episode of major depressive disorder (H)  F32.1 FLUoxetine (PROZAC) 20 MG capsule      2. Anxiety  F41.9 FLUoxetine (PROZAC) 20 MG capsule      3. BMI 32.0-32.9,adult  Z68.32          MDD/anxiety/BMI 32-  Worsening anxiety sx's lately.  Used to be well controlled on citalopram, now coming back, though not as bad as prior to going on meds.  lexapro prior did not work well.  No other med trials.  Discussed options, would prefer not buspar, would be hard to add remembering another medication in the pm.  Citalopram at best max dose. Weight concerns- really working on losing weight.  Working with nutritionist x 4 months, making progress, down ~15 lbs to ~203 lbs now.  Will switch from citalopram 20mg/d to fluoxetine 20mg/d.  Transition wk-- fluoxetine every other day, citalopram 10mg/d.  Next week, stop citalopram, fluoxetine 20mg/d.  Follow-up in ~6 wks for recheck, virtual video should work well.  Will see if dose increase needed or not.         BMI:   Estimated body mass index is 34.16 kg/m  as calculated from the following:    Height as of 12/7/22: 1.679 m (5' 6.1\").    Weight as of 12/7/22: 96.3 kg (212 lb 4.8 oz).   Weight management plan: Working with nutritionist    Maris Garces MD  [facility]            Subjective   Cheyenne is a 47 year old, presenting for the following health issues:  Medication Problem (Adjustment of medications )        4/21/2023     1:53 PM   Additional Questions   Roomed by honey waterman   Accompanied by n/a         4/21/2023     1:53 PM   Patient Reported Additional Medications   Patient reports taking the following new " medications n/a     History of Present Illness       Reason for visit:  Med check/med adjustment    She eats 2-3 servings of fruits and vegetables daily.She consumes 0 sweetened beverage(s) daily.She exercises with enough effort to increase her heart rate 20 to 29 minutes per day.  She exercises with enough effort to increase her heart rate 3 or less days per week.   She is taking medications regularly.     Worsening anxiety sx's.  Normally, when she first was at this dose, after a few weeks, sx's were under very good control.  Now, when she feels anxious, feels butterfly feelings in her stomach again.  Worrying about stupid stuff, things she can't do anything about.  Irritable with family members.  Not as bad as pre-meds, but went from fixing the problem, to not as well controlled.    Has been on lexapro in the past.  Switched from lexapro to citalopram- lexapro wasn't working very well.    Has been trying to lose weight- down ~15 lbs from starting with a nutritionist.  Wt now - 203 lbs.  Working with her for four months.          Review of Systems   Constitutional, HEENT, cardiovascular, pulmonary, gi and gu systems are negative, except as otherwise noted.      Objective    Vitals - Patient Reported  Systolic (Patient Reported):  (n/a)  Diastolic (Patient Reported):  (n/a)  Weight (Patient Reported):  (n/a)  Height (Patient Reported):  (n/a)  SpO2 (Patient Reported):  (n/a)  Temperature (Patient Reported):  (n/a)  Pulse (Patient Reported):  (n/a)  Pain Score: No Pain (0)        Physical Exam   GENERAL: Healthy, alert and no distress  EYES: Eyes grossly normal to inspection.  No discharge or erythema, or obvious scleral/conjunctival abnormalities.  RESP: No audible wheeze, cough, or visible cyanosis.  No visible retractions or increased work of breathing.    SKIN: Visible skin clear. No significant rash, abnormal pigmentation or lesions.  NEURO: Cranial nerves grossly intact.  Mentation and speech appropriate for  age.  PSYCH: Mentation appears normal, affect normal/bright, judgement and insight intact, normal speech and appearance well-groomed.          Video-Visit Details    Type of service:  Video Visit     Originating Location (pt. Location): Home  Distant Location (provider location):  On-site  Platform used for Video Visit: Yes[employer]Well

## 2023-05-03 ENCOUNTER — MYC MEDICAL ADVICE (OUTPATIENT)
Dept: FAMILY MEDICINE | Facility: CLINIC | Age: 48
End: 2023-05-03
Payer: COMMERCIAL

## 2023-06-07 ENCOUNTER — VIRTUAL VISIT (OUTPATIENT)
Dept: FAMILY MEDICINE | Facility: CLINIC | Age: 48
End: 2023-06-07
Payer: COMMERCIAL

## 2023-06-07 DIAGNOSIS — D12.6 SERRATED POLYPOSIS SYNDROME: ICD-10-CM

## 2023-06-07 DIAGNOSIS — F41.9 ANXIETY: Primary | ICD-10-CM

## 2023-06-07 DIAGNOSIS — F32.1 MODERATE SINGLE CURRENT EPISODE OF MAJOR DEPRESSIVE DISORDER (H): ICD-10-CM

## 2023-06-07 DIAGNOSIS — R63.4 WEIGHT LOSS: ICD-10-CM

## 2023-06-07 PROCEDURE — 99214 OFFICE O/P EST MOD 30 MIN: CPT | Mod: VID | Performed by: FAMILY MEDICINE

## 2023-06-07 RX ORDER — CITALOPRAM HYDROBROMIDE 20 MG/1
20 TABLET ORAL DAILY
Qty: 60 TABLET | Refills: 0 | Status: SHIPPED | OUTPATIENT
Start: 2023-06-07 | End: 2023-09-14

## 2023-06-07 ASSESSMENT — ANXIETY QUESTIONNAIRES
6. BECOMING EASILY ANNOYED OR IRRITABLE: SEVERAL DAYS
5. BEING SO RESTLESS THAT IT IS HARD TO SIT STILL: NOT AT ALL
GAD7 TOTAL SCORE: 4
3. WORRYING TOO MUCH ABOUT DIFFERENT THINGS: SEVERAL DAYS
GAD7 TOTAL SCORE: 4
IF YOU CHECKED OFF ANY PROBLEMS ON THIS QUESTIONNAIRE, HOW DIFFICULT HAVE THESE PROBLEMS MADE IT FOR YOU TO DO YOUR WORK, TAKE CARE OF THINGS AT HOME, OR GET ALONG WITH OTHER PEOPLE: NOT DIFFICULT AT ALL
7. FEELING AFRAID AS IF SOMETHING AWFUL MIGHT HAPPEN: NOT AT ALL
2. NOT BEING ABLE TO STOP OR CONTROL WORRYING: SEVERAL DAYS
1. FEELING NERVOUS, ANXIOUS, OR ON EDGE: SEVERAL DAYS

## 2023-06-07 ASSESSMENT — PATIENT HEALTH QUESTIONNAIRE - PHQ9
5. POOR APPETITE OR OVEREATING: NOT AT ALL
SUM OF ALL RESPONSES TO PHQ QUESTIONS 1-9: 2

## 2023-06-07 NOTE — PROGRESS NOTES
Cheyenne is a 47 year old who is being evaluated via a billable video visit.      How would you like to obtain your AVS? MyChart  If the video visit is dropped, the invitation should be resent by: Text to cell phone: [phone]  Will anyone else be joining your video visit? No        Assessment & Plan     1. Anxiety/2. Moderate single current episode of major depressive disorder (H)  Anxiety sx's do not seem to be doing as well with the switch from citalopram to fluoxetine.  Citalopram worked really well at first, but then not as well with increased stressors over the last year.  Stressors now less, and in a better place.  Discussed multiple options-- going back on citalopram after a break, and can go up to 40mg/d if needed.  Also buspar option, but she is wary of 2x/day dosing.  Or can try another med option, but would likely go to SNRI option, which can have more se's.  Doesn't think appetite changed with switch from citalopram and fluoxetine, and felt wt loss started prior to switch.  Will go back to citalopram 20mg/d, follow-up in 6 wks to see if doing better.  - citalopram (CELEXA) 20 MG tablet; Take 1 tablet (20 mg) by mouth daily  Dispense: 60 tablet; Refill: 0    3. Weight loss   Working with nutrition , very helpful.   Increasing lean protein, tracking macros.  Wants to increase exercise.  BMI down from 34 in 12/22 to 32 now (199 lbs - goal ~180 lbs).  Will cont great work.    4. Serrated polyposis syndrome  Serrated polyps, multiple on 3/23 scope.  Follow-up scope in 6 months.   Likely q1-3 yr colonoscopies for her, and first degree relatives should start scopes at age 40, and minimum 5 yr follow-up.    Return in about 6 weeks (around 7/19/2023) for Anxiety, Depression.    Maris Garces MD  [facility]   Cheyenne is a 47 year old, presenting for the following health issues:   Follow Up        6/7/2023     8:27 AM   Additional Questions   Roomed by  Radha     History of Present Illness       Reason for visit:  Med check for anxiety    She eats 2-3 servings of fruits and vegetables daily.She consumes 0 sweetened beverage(s) daily.She exercises with enough effort to increase her heart rate 30 to 60 minutes per day.  She exercises with enough effort to increase her heart rate 3 or less days per week.   She is taking medications regularly.       Anxiety Follow-Up    How are you doing with your anxiety since your last visit? No change    Are you having other symptoms that might be associated with anxiety? No    Have you had a significant life event? No     Are you feeling depressed? No    Do you have any concerns with your use of alcohol or other drugs? No    Last time, worse anxiety, more stressors, felt like the citalopram helped a lot at first, but wasn't helping with the increased stressors, or wasn't working as well.  So at last visit, switched from citalopram 20mg/d to fluoxetine 20mg/d.    Feels like the switch is not as good.  Feels like it's worse, anxiety is less controlled.    Mood is okay, not changed.  Lexapro also helped at first, but then stopped working in past.  Has only tried these three meds.    Weight-   Still working on the wt loss- hit 199 lbs- down from 212 lbs here in 12/22 (and she states she hit 218 lbs at one point.  Working with nutrition - going very slowly but very sustainably.  Racking macros. Adding a lot more lean protein to her diet.    gives good tips, good encouragement, lots of texting, support.  Feels like she's in a better spot with it.  Goal is ~180 lbs.    Trying to exercise more, but needs to work more on it.  Currently- nice out, so longer walks (hits 10,000 steps/day), and peloton rides (sporadic, ave 1x/wk, would love 3-4x/wk).      Other updates- abnl colonoscopy findings.  Went in for colonoscopy in 3/23- found quite a few polyps.  Sessile polyposis syndrome - met criteria.    No fam h/o, parents and older  sister with minimal polyps.   Needs to go back from 6 month follow-up- then likely q1-3 yrs.    Social History     Tobacco Use     Smoking status: Never     Smokeless tobacco: Never   Substance Use Topics     Alcohol use: Yes     Comment: 2 drinks monthly     Drug use: No         5/24/2022     8:40 AM 12/7/2022    10:05 AM 4/21/2023     2:49 PM   HEMANTH-7 SCORE   Total Score 5 (mild anxiety)     Total Score 5 3 6         5/24/2022     8:40 AM 12/7/2022     6:55 AM 4/21/2023     2:49 PM   PHQ   PHQ-9 Total Score 2 1 4   Q9: Thoughts of better off dead/self-harm past 2 weeks Not at all Not at all Not at all         Review of Systems   Constitutional, HEENT, cardiovascular, pulmonary, gi and gu systems are negative, except as otherwise noted.      Objective           Vitals:  No vitals were obtained today due to virtual visit.    Physical Exam   GENERAL: Healthy, alert and no distress  EYES: Eyes grossly normal to inspection.  No discharge or erythema, or obvious scleral/conjunctival abnormalities.  RESP: No audible wheeze, cough, or visible cyanosis.  No visible retractions or increased work of breathing.    SKIN: Visible skin clear. No significant rash, abnormal pigmentation or lesions.  NEURO: Cranial nerves grossly intact.  Mentation and speech appropriate for age.  PSYCH: Mentation appears normal, affect normal/bright, judgement and insight intact, normal speech and appearance well-groomed.          Video-Visit Details    Type of service:  Video Visit     Originating Location (pt. Location): Home  Distant Location (provider location):  On-site  Platform used for Video Visit: Fast[employer]Well

## 2023-07-03 ENCOUNTER — MYC MEDICAL ADVICE (OUTPATIENT)
Dept: GASTROENTEROLOGY | Facility: CLINIC | Age: 48
End: 2023-07-03
Payer: COMMERCIAL

## 2023-09-13 ENCOUNTER — E-VISIT (OUTPATIENT)
Dept: FAMILY MEDICINE | Facility: CLINIC | Age: 48
End: 2023-09-13
Payer: COMMERCIAL

## 2023-09-13 DIAGNOSIS — F32.1 MODERATE SINGLE CURRENT EPISODE OF MAJOR DEPRESSIVE DISORDER (H): Primary | ICD-10-CM

## 2023-09-13 DIAGNOSIS — F41.9 ANXIETY: ICD-10-CM

## 2023-09-13 PROCEDURE — 99421 OL DIG E/M SVC 5-10 MIN: CPT | Performed by: FAMILY MEDICINE

## 2023-09-13 ASSESSMENT — ANXIETY QUESTIONNAIRES
5. BEING SO RESTLESS THAT IT IS HARD TO SIT STILL: NOT AT ALL
7. FEELING AFRAID AS IF SOMETHING AWFUL MIGHT HAPPEN: NOT AT ALL
4. TROUBLE RELAXING: NOT AT ALL
GAD7 TOTAL SCORE: 4
6. BECOMING EASILY ANNOYED OR IRRITABLE: SEVERAL DAYS
2. NOT BEING ABLE TO STOP OR CONTROL WORRYING: SEVERAL DAYS
1. FEELING NERVOUS, ANXIOUS, OR ON EDGE: SEVERAL DAYS
3. WORRYING TOO MUCH ABOUT DIFFERENT THINGS: SEVERAL DAYS
GAD7 TOTAL SCORE: 4

## 2023-09-13 ASSESSMENT — PATIENT HEALTH QUESTIONNAIRE - PHQ9
10. IF YOU CHECKED OFF ANY PROBLEMS, HOW DIFFICULT HAVE THESE PROBLEMS MADE IT FOR YOU TO DO YOUR WORK, TAKE CARE OF THINGS AT HOME, OR GET ALONG WITH OTHER PEOPLE: NOT DIFFICULT AT ALL
SUM OF ALL RESPONSES TO PHQ QUESTIONS 1-9: 4
SUM OF ALL RESPONSES TO PHQ QUESTIONS 1-9: 4

## 2023-09-14 RX ORDER — CITALOPRAM HYDROBROMIDE 20 MG/1
20 TABLET ORAL DAILY
Qty: 90 TABLET | Refills: 0 | Status: SHIPPED | OUTPATIENT
Start: 2023-09-14 | End: 2023-12-12

## 2023-09-14 ASSESSMENT — PATIENT HEALTH QUESTIONNAIRE - PHQ9: SUM OF ALL RESPONSES TO PHQ QUESTIONS 1-9: 4

## 2023-09-14 ASSESSMENT — ANXIETY QUESTIONNAIRES: GAD7 TOTAL SCORE: 4

## 2023-09-14 NOTE — PATIENT INSTRUCTIONS
Thank you for choosing us for your care. I have placed an order for a prescription so that you can continue treatment with citalopram 20mg daily.       I am glad you are feeling better on this option (the PHQ/HEMANTH scores are pretty flat). I was hoping for more details as you've had enough changes in medications that details become very helpful going forward (next time we should do a video visit as we had discussed).  Please make a physical appointment for Dec (last one was in 12/22) and we can combine that one with preventive cares and mood/anxiety management.  I would schedule that soon to find a spot.    View your full visit summary for details by clicking on the link below. Your pharmacist will able to address any questions you may have about the medication.      If you're not feeling better within 4-6 weeks please schedule an appointment.  You can schedule an appointment right here in [facility], or call [phone]  If the visit is for the same symptoms as your eVisit, we'll refund the cost of your eVisit if seen within seven days.

## 2023-10-31 ENCOUNTER — HOSPITAL ENCOUNTER (OUTPATIENT)
Dept: MAMMOGRAPHY | Facility: CLINIC | Age: 48
Discharge: HOME | End: 2023-10-31
Attending: FAMILY MEDICINE | Admitting: FAMILY MEDICINE
Payer: COMMERCIAL

## 2023-10-31 DIAGNOSIS — Z12.31 BREAST CANCER SCREENING BY MAMMOGRAM: ICD-10-CM

## 2023-10-31 PROCEDURE — 77067 SCR MAMMO BI INCL CAD: CPT

## 2023-11-09 ENCOUNTER — PATIENT OUTREACH (OUTPATIENT)
Dept: GASTROENTEROLOGY | Facility: CLINIC | Age: 48
End: 2023-11-09
Payer: COMMERCIAL

## 2023-11-09 DIAGNOSIS — Z12.11 SPECIAL SCREENING FOR MALIGNANT NEOPLASMS, COLON: Primary | ICD-10-CM

## 2023-11-09 NOTE — PROGRESS NOTES
"Patients last colonoscopy on file was in March 2023 and was recommended to repeat in 6 months. Patient is now due and meets CRC criteria for standing order.    CRC Screening Colonoscopy Referral Review    Patient meets the inclusion criteria for screening colonoscopy standing order.    Ordering/Referring Provider:  Maris Garces MD    BMI: Estimated body mass index is 34.16 kg/m  as calculated from the following:    Height as of 12/7/22: 1.679 m (5' 6.1\").    Weight as of 12/7/22: 96.3 kg (212 lb 4.8 oz).     Sedation:  Does patient have any of the following conditions affecting sedation?  No medical conditions affecting sedation.    Previous Scopes:  Any previous recommendations or follow up needs based on previous scope?  na / No recommendations.    Medical Concerns to Postpone Order:  Does patient have any of the following medical concerns that should postpone/delay colonoscopy referral?  No medical conditions affecting colonoscopy referral.    Final Referral Details:  Based on patient's medical history patient is appropriate for referral order with moderate sedation. If patient's BMI > 50 do not schedule in [facility].  "

## 2023-11-30 ENCOUNTER — TRANSFERRED RECORDS (OUTPATIENT)
Dept: HEALTH INFORMATION MANAGEMENT | Facility: CLINIC | Age: 48
End: 2023-11-30
Payer: COMMERCIAL

## 2023-12-05 ENCOUNTER — TELEPHONE (OUTPATIENT)
Dept: FAMILY MEDICINE | Facility: CLINIC | Age: 48
End: 2023-12-05
Payer: COMMERCIAL

## 2023-12-05 DIAGNOSIS — Z13.6 CARDIOVASCULAR SCREENING; LDL GOAL LESS THAN 160: Primary | ICD-10-CM

## 2023-12-05 DIAGNOSIS — G43.009 MIGRAINE WITHOUT AURA AND WITHOUT STATUS MIGRAINOSUS, NOT INTRACTABLE: ICD-10-CM

## 2023-12-05 ASSESSMENT — ENCOUNTER SYMPTOMS
HEADACHES: 1
NERVOUS/ANXIOUS: 1
MYALGIAS: 0
PARESTHESIAS: 0
SORE THROAT: 0
CONSTIPATION: 0
HEMATOCHEZIA: 0
CHILLS: 0
NAUSEA: 0
SHORTNESS OF BREATH: 0
DYSURIA: 0
WEAKNESS: 0
HEARTBURN: 0
ARTHRALGIAS: 0
DIZZINESS: 0
BREAST MASS: 0
DIARRHEA: 0
EYE PAIN: 0
ABDOMINAL PAIN: 0
COUGH: 0
JOINT SWELLING: 0
PALPITATIONS: 0
FEVER: 0
HEMATURIA: 0
FREQUENCY: 0

## 2023-12-05 NOTE — TELEPHONE ENCOUNTER
Triage,   Patient called.   Wondering if we can order labs ahead of time so patient can discuss it at her appt w/ [facility] on 12/12/23.   Patient is specifically asking for labs ordered to complete a biometric screening form.   Patient will like a call back once orders are completed.   Thanks!  Tia LEBRON

## 2023-12-06 NOTE — TELEPHONE ENCOUNTER
TC's,      Please see messages below.  Can someone please call patient and let her know lab orders have been signed?      Thank you!    Randa Wilson RN

## 2023-12-07 ENCOUNTER — LAB (OUTPATIENT)
Dept: LAB | Facility: CLINIC | Age: 48
End: 2023-12-07
Payer: COMMERCIAL

## 2023-12-07 DIAGNOSIS — G43.009 MIGRAINE WITHOUT AURA AND WITHOUT STATUS MIGRAINOSUS, NOT INTRACTABLE: ICD-10-CM

## 2023-12-07 DIAGNOSIS — Z13.6 CARDIOVASCULAR SCREENING; LDL GOAL LESS THAN 160: ICD-10-CM

## 2023-12-07 LAB
ALBUMIN SERPL BCG-MCNC: 4 G/DL (ref 3.5–5.2)
ALP SERPL-CCNC: 56 U/L (ref 40–150)
ALT SERPL W P-5'-P-CCNC: 24 U/L (ref 0–50)
ANION GAP SERPL CALCULATED.3IONS-SCNC: 9 MMOL/L (ref 7–15)
AST SERPL W P-5'-P-CCNC: 25 U/L (ref 0–45)
BILIRUB SERPL-MCNC: 0.4 MG/DL
BUN SERPL-MCNC: 10.9 MG/DL (ref 6–20)
CALCIUM SERPL-MCNC: 9 MG/DL (ref 8.6–10)
CHLORIDE SERPL-SCNC: 102 MMOL/L (ref 98–107)
CHOLEST SERPL-MCNC: 168 MG/DL
CREAT SERPL-MCNC: 0.77 MG/DL (ref 0.51–0.95)
DEPRECATED HCO3 PLAS-SCNC: 26 MMOL/L (ref 22–29)
EGFRCR SERPLBLD CKD-EPI 2021: >90 ML/MIN/1.73M2
FASTING STATUS PATIENT QL REPORTED: YES
GLUCOSE SERPL-MCNC: 87 MG/DL (ref 70–99)
HDLC SERPL-MCNC: 58 MG/DL
LDLC SERPL CALC-MCNC: 88 MG/DL
NONHDLC SERPL-MCNC: 110 MG/DL
POTASSIUM SERPL-SCNC: 4.1 MMOL/L (ref 3.4–5.3)
PROT SERPL-MCNC: 6.8 G/DL (ref 6.4–8.3)
SODIUM SERPL-SCNC: 137 MMOL/L (ref 135–145)
TRIGL SERPL-MCNC: 111 MG/DL

## 2023-12-07 PROCEDURE — 36415 COLL VENOUS BLD VENIPUNCTURE: CPT

## 2023-12-07 PROCEDURE — 80061 LIPID PANEL: CPT

## 2023-12-07 PROCEDURE — 80053 COMPREHEN METABOLIC PANEL: CPT

## 2023-12-12 ENCOUNTER — OFFICE VISIT (OUTPATIENT)
Dept: FAMILY MEDICINE | Facility: CLINIC | Age: 48
End: 2023-12-12
Payer: COMMERCIAL

## 2023-12-12 VITALS
DIASTOLIC BLOOD PRESSURE: 71 MMHG | WEIGHT: 212.3 LBS | TEMPERATURE: 97.3 F | RESPIRATION RATE: 16 BRPM | OXYGEN SATURATION: 96 % | HEIGHT: 66 IN | SYSTOLIC BLOOD PRESSURE: 104 MMHG | HEART RATE: 68 BPM | BODY MASS INDEX: 34.12 KG/M2

## 2023-12-12 DIAGNOSIS — Z30.431 INTRAUTERINE DEVICE SURVEILLANCE: ICD-10-CM

## 2023-12-12 DIAGNOSIS — Z00.00 ROUTINE GENERAL MEDICAL EXAMINATION AT A HEALTH CARE FACILITY: Primary | ICD-10-CM

## 2023-12-12 DIAGNOSIS — D22.72 MELANOCYTIC NEVUS OF LEFT LOWER EXTREMITY: ICD-10-CM

## 2023-12-12 DIAGNOSIS — Z13.6 CARDIOVASCULAR SCREENING; LDL GOAL LESS THAN 160: ICD-10-CM

## 2023-12-12 DIAGNOSIS — F32.1 MODERATE SINGLE CURRENT EPISODE OF MAJOR DEPRESSIVE DISORDER (H): ICD-10-CM

## 2023-12-12 DIAGNOSIS — G43.009 MIGRAINE WITHOUT AURA AND WITHOUT STATUS MIGRAINOSUS, NOT INTRACTABLE: ICD-10-CM

## 2023-12-12 DIAGNOSIS — D12.6 SERRATED POLYPOSIS SYNDROME: ICD-10-CM

## 2023-12-12 DIAGNOSIS — F41.9 ANXIETY: ICD-10-CM

## 2023-12-12 PROCEDURE — 99396 PREV VISIT EST AGE 40-64: CPT | Performed by: FAMILY MEDICINE

## 2023-12-12 RX ORDER — BUPROPION HYDROCHLORIDE 150 MG/1
150 TABLET ORAL EVERY MORNING
Qty: 60 TABLET | Refills: 0 | Status: SHIPPED | OUTPATIENT
Start: 2023-12-12 | End: 2024-01-23

## 2023-12-12 RX ORDER — CITALOPRAM HYDROBROMIDE 20 MG/1
20 TABLET ORAL DAILY
Qty: 60 TABLET | Refills: 0 | Status: SHIPPED | OUTPATIENT
Start: 2023-12-12 | End: 2024-01-23

## 2023-12-12 ASSESSMENT — PATIENT HEALTH QUESTIONNAIRE - PHQ9
SUM OF ALL RESPONSES TO PHQ QUESTIONS 1-9: 3
SUM OF ALL RESPONSES TO PHQ QUESTIONS 1-9: 3
10. IF YOU CHECKED OFF ANY PROBLEMS, HOW DIFFICULT HAVE THESE PROBLEMS MADE IT FOR YOU TO DO YOUR WORK, TAKE CARE OF THINGS AT HOME, OR GET ALONG WITH OTHER PEOPLE: NOT DIFFICULT AT ALL

## 2023-12-12 ASSESSMENT — ENCOUNTER SYMPTOMS
HEARTBURN: 0
NERVOUS/ANXIOUS: 1
EYE PAIN: 0
FREQUENCY: 0
HEMATURIA: 0
HEADACHES: 1
MYALGIAS: 0
WEAKNESS: 0
SHORTNESS OF BREATH: 0
CONSTIPATION: 0
SORE THROAT: 0
DIZZINESS: 0
JOINT SWELLING: 0
NAUSEA: 0
ARTHRALGIAS: 0
FEVER: 0
ABDOMINAL PAIN: 0
PALPITATIONS: 0
BREAST MASS: 0
DIARRHEA: 0
CHILLS: 0
DYSURIA: 0
HEMATOCHEZIA: 0
PARESTHESIAS: 0
COUGH: 0

## 2023-12-12 ASSESSMENT — ANXIETY QUESTIONNAIRES
1. FEELING NERVOUS, ANXIOUS, OR ON EDGE: SEVERAL DAYS
GAD7 TOTAL SCORE: 3
7. FEELING AFRAID AS IF SOMETHING AWFUL MIGHT HAPPEN: NOT AT ALL
IF YOU CHECKED OFF ANY PROBLEMS ON THIS QUESTIONNAIRE, HOW DIFFICULT HAVE THESE PROBLEMS MADE IT FOR YOU TO DO YOUR WORK, TAKE CARE OF THINGS AT HOME, OR GET ALONG WITH OTHER PEOPLE: SOMEWHAT DIFFICULT
3. WORRYING TOO MUCH ABOUT DIFFERENT THINGS: SEVERAL DAYS
5. BEING SO RESTLESS THAT IT IS HARD TO SIT STILL: NOT AT ALL
4. TROUBLE RELAXING: NOT AT ALL
2. NOT BEING ABLE TO STOP OR CONTROL WORRYING: NOT AT ALL
GAD7 TOTAL SCORE: 3
6. BECOMING EASILY ANNOYED OR IRRITABLE: SEVERAL DAYS

## 2023-12-12 ASSESSMENT — PAIN SCALES - GENERAL: PAINLEVEL: NO PAIN (0)

## 2023-12-12 NOTE — PROGRESS NOTES
SUBJECTIVE:   Cheyenne is a 48 year old, presenting for the following:  Physical      Healthy Habits:     Getting at least 3 servings of Calcium per day:  Yes    Bi-annual eye exam:  Yes    Dental care twice a year:  Yes    Sleep apnea or symptoms of sleep apnea:  None    Diet:  Regular (no restrictions)    Frequency of exercise:  2-3 days/week    Duration of exercise:  30-45 minutes    Taking medications regularly:  Yes    Medication side effects:  None    Additional concerns today:  Yes    Wt stable compared to last time- exact same.  Wt was down, was working with , really tracking macros, but got too busy, too hard to do that much tracking.  Was down in 190s lbs, nown at 212 lbs again.  Thinks about it too much.  When she stopped tracking as much, knows she's not getting enough protein when she's tracking. Not sure she's replacing with anything- may not be eating enough.  Hard to plan with life, wanted to just be able to go out on the boat and  tacos.    Their entertainment is going out to dinner.    Exercise- not great, good intentions.  More walking, getting 10,000 steps, some days.  In the last month, 1-2 intense exercise.  Does enjoy the peloton.      Citalopram- 20mg/d  Tried the fluoxetine- didn't have any impact.  The citalopram - went back to it, definitely helps.  Feels like she needs more.  Worries about a lot of things, very short.  Doesn't help with a teenage son, and .  Days she feels it.      Migraines- ~1x/month.  Behind her eye, throbbing.  Can tell it's coming.  Takes two aleve, goes to take a nap, and usually clearing when she's getting up.  Got the magnesium, but doesn't think she tried it consistently.      Answers submitted by the patient for this visit:  Adult Preventative Visit on 12/12/2023  8:00 AM with Maris Garces  HEMANTH-7 (Submitted on 12/12/2023)  HEMANTH 7 TOTAL SCORE: 3    Today's PHQ-9 Score:       12/12/2023     7:41 AM   PHQ-9 SCORE   PHQ-9 Total Score  MyChart 3 (Minimal depression)   PHQ-9 Total Score 3         Social History     Tobacco Use    Smoking status: Never    Smokeless tobacco: Never   Substance Use Topics    Alcohol use: Yes     Comment: 2 drinks monthly             2023     1:59 PM   Alcohol Use   Prescreen: >3 drinks/day or >7 drinks/week? No       Reviewed orders with patient.  Reviewed health maintenance and updated orders accordingly - Yes        Lab work is in process  Labs reviewed in EPIC    Breast Cancer Screenin/12/2021     1:58 PM   Breast CA Risk Assessment (FHS-7)   Do you have a family history of breast, colon, or ovarian cancer? No / Unknown         Mammogram Screening: Recommended annual mammography  Pertinent mammograms are reviewed under the imaging tab.    History of abnormal Pap smear: NO - age 30-65 PAP every 5 years with negative HPV co-testing recommended      Latest Ref Rng & Units 2022     7:31 AM 3/9/2018     9:22 AM 3/9/2018     8:59 AM   PAP / HPV   PAP  Negative for Intraepithelial Lesion or Malignancy (NILM)      PAP (Historical)    NIL    HPV 16 DNA Negative Negative  Negative     HPV 18 DNA Negative Negative  Negative     Other HR HPV Negative Negative  Negative       Reviewed and updated as needed this visit by clinical staff   Tobacco  Allergies  Meds  Problems  Med Hx  Surg Hx  Fam Hx          Reviewed and updated as needed this visit by Provider   Tobacco  Allergies  Meds  Problems  Med Hx  Surg Hx  Fam Hx             Review of Systems   Constitutional:  Negative for chills and fever.   HENT:  Negative for congestion, ear pain, hearing loss and sore throat.    Eyes:  Negative for pain and visual disturbance.   Respiratory:  Negative for cough and shortness of breath.    Cardiovascular:  Negative for chest pain, palpitations and peripheral edema.   Gastrointestinal:  Negative for abdominal pain, constipation, diarrhea, heartburn, hematochezia and nausea.   Breasts:  Negative for  "tenderness, breast mass and discharge.   Genitourinary:  Negative for dysuria, frequency, genital sores, hematuria, pelvic pain, urgency, vaginal bleeding and vaginal discharge.   Musculoskeletal:  Negative for arthralgias, joint swelling and myalgias.   Skin:  Negative for rash.   Neurological:  Positive for headaches. Negative for dizziness, weakness and paresthesias.   Psychiatric/Behavioral:  Negative for mood changes. The patient is nervous/anxious.         OBJECTIVE:   /71   Pulse 68   Temp 97.3  F (36.3  C) (Temporal)   Resp 16   Ht 1.676 m (5' 6\")   Wt 96.3 kg (212 lb 4.8 oz)   LMP  (LMP Unknown)   SpO2 96%   Breastfeeding No   BMI 34.27 kg/m    Physical Exam  GENERAL: healthy, alert and no distress  EYES: Eyes grossly normal to inspection, PERRL and conjunctivae and sclerae normal  HENT: ear canals and TM's normal, nose and mouth without ulcers or lesions  NECK: no adenopathy, no asymmetry, masses, or scars and thyroid normal to palpation  RESP: lungs clear to auscultation - no rales, rhonchi or wheezes  BREAST: normal without masses, tenderness or nipple discharge and no palpable axillary masses or adenopathy  CV: regular rate and rhythm, normal S1 S2, no S3 or S4, no murmur, click or rub, no peripheral edema and peripheral pulses strong  ABDOMEN: soft, nontender, no hepatosplenomegaly, no masses and bowel sounds normal  MS: no gross musculoskeletal defects noted, no edema  SKIN: no suspicious lesions or rashes  NEURO: Normal strength and tone, mentation intact and speech normal  PSYCH: mentation appears normal, affect normal/bright    Diagnostic Test Results:  Labs reviewed in Epic    ASSESSMENT/PLAN:       ICD-10-CM    1. Routine general medical examination at a health care facility  Z00.00       2. Anxiety  F41.9 buPROPion (WELLBUTRIN XL) 150 MG 24 hr tablet     citalopram (CELEXA) 20 MG tablet      3. Moderate single current episode of major depressive disorder (H)  F32.1 buPROPion " (WELLBUTRIN XL) 150 MG 24 hr tablet     citalopram (CELEXA) 20 MG tablet      4. Migraine without aura and without status migrainosus, not intractable  G43.009       5. Melanocytic nevus of left lower extremity  D22.72 Adult Dermatology  Referral      6. Serrated polyposis syndrome  D12.6       7. CARDIOVASCULAR SCREENING; LDL GOAL LESS THAN 160  Z13.6       8. Intrauterine device surveillance  Z30.431         CPE- Discussed diet, calcium and exercise.  Thin prep pap was not done.  Eye and dental care UTD or recommended f/u.  Consider COVID and Hep B, but will put off immunizations today.  See orders below for tests ordered and screening needed.   Reviewed labs- lipids, glucose, CMP looks great.  Biometric form filled out for work.    Reviewed colonoscopies- had 6mo follow-up due to lots of polyps, recheck in 12/22 better, 2 yr follow-up rec.      Anxiety/MDD/BMI 34 - had been doing fairly well back on the citalopram (fluoxetine hadn't seemed to help much), but still feels worse irritability and episodes of higher physical sx's of anxiety lately.  Tough when dealing with teenager.  Struggling with weight, constant thoughts of food, hard to get to intense exercise, stopped tracking macros consistently after getting busy after this summer, and wt back up from 190s to 212 lbs today.  Discussed options.  Going up to citalopram 40mg/d okay but not the best option.  Will add wellbutrin XL 150mg/d.  Usually great for mood/motivation.  Can be good for anxiety, and sometimes with weight.  Follow-up with video appt in ~6 wks (check wt prior).    Migraines- ~1x/month.  Aleve and nap usually helps.  Hasn't treid the magnesium consistently, will try it.    H/o melanocytic nevus- rec follow-up with derm for skin check.  Referral completed in case this helps scheduling.    Serrated polyposis syndrome- second colonoscopy better, now has 2 yr follow-up planned.            Patient has been advised of split billing  requirements and indicates understanding: Yes      COUNSELING:  Reviewed preventive health counseling, as reflected in patient instructions        She reports that she has never smoked. She has never used smokeless tobacco.          Maris Garces MD  [facility]

## 2024-01-22 ASSESSMENT — ANXIETY QUESTIONNAIRES
3. WORRYING TOO MUCH ABOUT DIFFERENT THINGS: SEVERAL DAYS
7. FEELING AFRAID AS IF SOMETHING AWFUL MIGHT HAPPEN: NOT AT ALL
1. FEELING NERVOUS, ANXIOUS, OR ON EDGE: SEVERAL DAYS
5. BEING SO RESTLESS THAT IT IS HARD TO SIT STILL: NOT AT ALL
GAD7 TOTAL SCORE: 2
IF YOU CHECKED OFF ANY PROBLEMS ON THIS QUESTIONNAIRE, HOW DIFFICULT HAVE THESE PROBLEMS MADE IT FOR YOU TO DO YOUR WORK, TAKE CARE OF THINGS AT HOME, OR GET ALONG WITH OTHER PEOPLE: SOMEWHAT DIFFICULT
8. IF YOU CHECKED OFF ANY PROBLEMS, HOW DIFFICULT HAVE THESE MADE IT FOR YOU TO DO YOUR WORK, TAKE CARE OF THINGS AT HOME, OR GET ALONG WITH OTHER PEOPLE?: SOMEWHAT DIFFICULT
6. BECOMING EASILY ANNOYED OR IRRITABLE: NOT AT ALL
4. TROUBLE RELAXING: NOT AT ALL
GAD7 TOTAL SCORE: 2
2. NOT BEING ABLE TO STOP OR CONTROL WORRYING: NOT AT ALL
7. FEELING AFRAID AS IF SOMETHING AWFUL MIGHT HAPPEN: NOT AT ALL

## 2024-01-23 ENCOUNTER — VIRTUAL VISIT (OUTPATIENT)
Dept: FAMILY MEDICINE | Facility: CLINIC | Age: 49
End: 2024-01-23
Attending: FAMILY MEDICINE
Payer: COMMERCIAL

## 2024-01-23 DIAGNOSIS — Z63.8 STRESS DUE TO FAMILY TENSION: ICD-10-CM

## 2024-01-23 DIAGNOSIS — F41.9 ANXIETY: ICD-10-CM

## 2024-01-23 DIAGNOSIS — F32.1 MODERATE SINGLE CURRENT EPISODE OF MAJOR DEPRESSIVE DISORDER (H): Primary | ICD-10-CM

## 2024-01-23 DIAGNOSIS — Z30.431 INTRAUTERINE DEVICE SURVEILLANCE: ICD-10-CM

## 2024-01-23 PROCEDURE — 99214 OFFICE O/P EST MOD 30 MIN: CPT | Mod: 95 | Performed by: FAMILY MEDICINE

## 2024-01-23 RX ORDER — BUPROPION HYDROCHLORIDE 150 MG/1
150 TABLET ORAL EVERY MORNING
Qty: 90 TABLET | Refills: 1 | Status: SHIPPED | OUTPATIENT
Start: 2024-01-23 | End: 2024-07-24

## 2024-01-23 RX ORDER — CITALOPRAM HYDROBROMIDE 20 MG/1
20 TABLET ORAL DAILY
Qty: 90 TABLET | Refills: 1 | Status: SHIPPED | OUTPATIENT
Start: 2024-01-23 | End: 2024-07-23

## 2024-01-23 SDOH — SOCIAL STABILITY - SOCIAL INSECURITY: OTHER SPECIFIED PROBLEMS RELATED TO PRIMARY SUPPORT GROUP: Z63.8

## 2024-01-23 NOTE — LETTER
My Depression Action Plan  Name: Cheyenne Kapoor   Date of Birth 1975  Date: 1/23/2024    My doctor: Maris Garces   My clinic: [facility]  [address], [address]  [facility] [address] [phone]            GREEN    ZONE   Good Control    What it looks like:   Things are going generally well. You have normal ups and downs. You may even feel depressed from time to time, but bad moods usually last less than a day.   What you need to do:  Continue to care for yourself (see self care plan)  Check your depression survival kit and update it as needed  Follow your physician s recommendations including any medication.  Do not stop taking medication unless you consult with your physician first.             YELLOW         ZONE Getting Worse    What it looks like:   Depression is starting to interfere with your life.   It may be hard to get out of bed; you may be starting to isolate yourself from others.  Symptoms of depression are starting to last most all day and this has happened for several days.   You may have suicidal thoughts but they are not constant.   What you need to do:     Call your care team. Your response to treatment will improve if you keep your care team informed of your progress. Yellow periods are signs an adjustment may need to be made.     Continue your self-care.  Just get dressed and ready for the day.  Don't give yourself time to talk yourself out of it.    Talk to someone in your support network.    Open up your Depression Self-Care Plan/Wellness Kit.             RED    ZONE Medical Alert - Get Help    What it looks like:   Depression is seriously interfering with your life.   You may experience these or other symptoms: You can t get out of bed most days, can t work or engage in other necessary activities, you have trouble taking care of basic hygiene, or basic responsibilities, thoughts of suicide or death that will not  go away, self-injurious behavior.     What you need to do:  Call your care team and request a same-day appointment. If they are not available (weekends or after hours) call your local crisis line, emergency room or 911.          Depression Self-Care Plan / Wellness Kit    Many people find that medication and therapy are helpful treatments for managing depression. In addition, making small changes to your everyday life can help to boost your mood and improve your wellbeing. Below are some tips for you to consider. Be sure to talk with your medical provider and/or behavioral health consultant if your symptoms are worsening or not improving.     Sleep   Sleep hygiene  means all of the habits that support good, restful sleep. It includes maintaining a consistent bedtime and wake time, using your bedroom only for sleeping or sex, and keeping the bedroom dark and free of distractions like a computer, smartphone, or television.     Develop a Healthy Routine  Maintain good hygiene. Get out of bed in the morning, make your bed, brush your teeth, take a shower, and get dressed. Don t spend too much time viewing media that makes you feel stressed. Find time to relax each day.    Exercise  Get some form of exercise every day. This will help reduce pain and release endorphins, the  feel good  chemicals in your brain. It can be as simple as just going for a walk or doing some gardening, anything that will get you moving.      Diet  Strive to eat healthy foods, including fruits and vegetables. Drink plenty of water. Avoid excessive sugar, caffeine, alcohol, and other mood-altering substances.     Stay Connected with Others  Stay in touch with friends and family members.    Manage Your Mood  Try deep breathing, massage therapy, biofeedback, or meditation. Take part in fun activities when you can. Try to find something to smile about each day.     Psychotherapy  Be open to working with a therapist if your provider recommends it.      Medication  Be sure to take your medication as prescribed. Most anti-depressants need to be taken every day. It usually takes several weeks for medications to work. Not all medicines work for all people. It is important to follow-up with your provider to make sure you have a treatment plan that is working for you. Do not stop your medication abruptly without first discussing it with your provider.    Crisis Resources   These hotlines are for both adults and children. They and are open 24 hours a day, 7 days a week unless noted otherwise.    National Suicide Prevention Lifeline   988 or 2-879-317-ACXF (5944)    Crisis Text Line    www.crisistextline.org  Text HOME to 905426 from anywhere in the [place], anytime, about any type of crisis. A live, trained crisis counselor will receive the text and respond quickly.    Jan Lifeline for LGBTQ Youth  A national crisis intervention and suicide lifeline for LGBTQ youth under 25. Provides a safe place to talk without judgement. Call 1-430.770.9422; text START to 673504 or visit www.thetrevorproject.org to talk to a trained counselor.    For Counts include 234 beds at the [facility] crisis numbers, visit the [facility] website at:  https://[place].gov/dhs/people-we-serve/adults/health-care/mental-health/resources/crisis-contacts.jsp

## 2024-01-23 NOTE — PROGRESS NOTES
Cheyenne is a 48 year old who is being evaluated via a billable video visit.      How would you like to obtain your AVS? MyChart  If the video visit is dropped, the invitation should be resent by: Text to cell phone: [phone]  Will anyone else be joining your video visit? No          Assessment & Plan       ICD-10-CM    1. Moderate single current episode of major depressive disorder (H)  F32.1 buPROPion (WELLBUTRIN XL) 150 MG 24 hr tablet     citalopram (CELEXA) 20 MG tablet      2. Anxiety  F41.9 buPROPion (WELLBUTRIN XL) 150 MG 24 hr tablet     citalopram (CELEXA) 20 MG tablet      3. Stress due to family tension  Z63.8       4. Intrauterine device surveillance  Z30.431          MDD/anxiety/family stressors - Mood/anxiety symptoms under good control now with addition of wellbutrin XL 150mg/d to her citalopram 20mg/d. In hindsight, wishes we had added/increased doses earlier, because her  recently told her that her sx's have been taking a toll on him/their relationship, and is having doubts about their marriage.  They are both in counseling now, and she is hoping they can get to couples counseling.  Found great therapist through [employer] online.  Of note, there was push back on having the recommended follow-up video appt in 9/23, so she just did evisit with much less information than requested of pt. Added wellbutrin at physical/mdd appt in 12/23.  No side effects on current meds/doses.    --Will continue citalopram 20mg/d and wellbutrin XL 150mg/d.  Refills sent.  Continue therapy.  --Continue every six month follow-up, but sooner if concerns or worsening sx's.     Follow-up Visit   Expected date:  Jul 23, 2024 (Approximate)      Follow Up Appointment Details:     Follow-up with whom?: Me    Follow-Up for what?: Chronic Disease f/u    Chronic Disease f/u:  Depression  Anxiety       How?: Video                             Subjective   Cheyenne is a 48 year old, presenting for the following health  issues:  Depression and Anxiety        1/23/2024    10:45 AM   Additional Questions   Roomed by irina de león   Accompanied by self     History of Present Illness       Mental Health Follow-up:  Patient presents to follow-up on Depression & Anxiety.Patient's depression since last visit has been:  Better  The patient is not having other symptoms associated with depression.  Patient's anxiety since last visit has been:  Better  The patient is not having other symptoms associated with anxiety.  Any significant life events: relationship concerns and job concerns  Patient is not feeling anxious or having panic attacks.  Patient has no concerns about alcohol or drug use.    She eats 2-3 servings of fruits and vegetables daily.She consumes 0 sweetened beverage(s) daily.She exercises with enough effort to increase her heart rate 20 to 29 minutes per day.  She exercises with enough effort to increase her heart rate 3 or less days per week.   She is taking medications regularly.     Added wellbutrin XL 150mg to her citalopram at last visit, and she feels it's been great. Feels it is having a substantial positive impact. Within a few weeks, she 'felt different', in a different place.  Enters the day with a clearer head, and more of an awareness of what is going around her.  Able to be her authentic self. Doing things for herself. Mood is better.  Irritability is not too bad.      Also working on this in therapy- good fit therapist- clinical, different tools, [facility] [employer] site- Lauren Rendon.  Virtual.  45 min video weekly now.Also has the ability to send msgs.  Gets back to her within the day.  Very attentive. ~$90/session/wk.  Includes the weekly therapy, msgs and group sessions.     Wt stable-   Wt 212-213 lbs.  Will cont to keep an eye on it.    Feels like a good fit, therapy and current meds- wellbutrin XL 150mg/d and citalopram 20mg/d.      Timing....  As she's starting to feel better.  Things really helping, coming out of a  "fog.   broke down and let her know how unhappy he's been.  Her sx's had been really taking a toll on him and their relationship.  Doing individual therapy on their own for now.  He wants to figure things out.  She's said she 'won't go down without a fight'.  Hard that he didn't say anything earlier.    Added stress, compared to last visit. Close to getting a new job.  Second in line for last two jobs. Great interviews.  Also, she felt she missed, is that she feels like she missed the opportunity to get on better txt sooner.  Didn't really know to ask for it, as the sx's didn't really affect her functionally at work or with the kids, but likely did with her .  Has learned in therapy, that she was able to do all of those things, done with work/kids, she had nothing left in the tank.  Exhausted.          Review of Systems  Constitutional, HEENT, cardiovascular, pulmonary, gi and gu systems are negative, except as otherwise noted.      Objective    Vitals - Patient Reported  Weight (Patient Reported): 96.2 kg (212 lb)  Height (Patient Reported): 167.6 cm (5' 6\")  BMI (Based on Pt Reported Ht/Wt): 34.22  Pain Score: No Pain (0)        Physical Exam   GENERAL: alert and no distress  EYES: Eyes grossly normal to inspection.  No discharge or erythema, or obvious scleral/conjunctival abnormalities.  RESP: No audible wheeze, cough, or visible cyanosis.    SKIN: Visible skin clear. No significant rash, abnormal pigmentation or lesions.  NEURO: Cranial nerves grossly intact.  Mentation and speech appropriate for age.  PSYCH: Appropriate affect, tone, and pace of words          Video-Visit Details    Type of service:  Video Visit     Originating Location (pt. Location): Home    Distant Location (provider location):  On-site  Platform used for Video Visit: Lc  Signed Electronically by: Maris Garces MD    "

## 2024-07-21 DIAGNOSIS — F41.9 ANXIETY: ICD-10-CM

## 2024-07-21 DIAGNOSIS — F32.1 MODERATE SINGLE CURRENT EPISODE OF MAJOR DEPRESSIVE DISORDER (H): ICD-10-CM

## 2024-07-23 RX ORDER — CITALOPRAM HYDROBROMIDE 20 MG/1
20 TABLET ORAL DAILY
Qty: 30 TABLET | Refills: 0 | Status: SHIPPED | OUTPATIENT
Start: 2024-07-23 | End: 2024-07-26

## 2024-07-24 DIAGNOSIS — F32.1 MODERATE SINGLE CURRENT EPISODE OF MAJOR DEPRESSIVE DISORDER (H): ICD-10-CM

## 2024-07-24 DIAGNOSIS — F41.9 ANXIETY: ICD-10-CM

## 2024-07-24 RX ORDER — BUPROPION HYDROCHLORIDE 150 MG/1
150 TABLET ORAL EVERY MORNING
Qty: 30 TABLET | Refills: 0 | Status: SHIPPED | OUTPATIENT
Start: 2024-07-24 | End: 2024-07-26

## 2024-07-25 ASSESSMENT — ANXIETY QUESTIONNAIRES
1. FEELING NERVOUS, ANXIOUS, OR ON EDGE: SEVERAL DAYS
3. WORRYING TOO MUCH ABOUT DIFFERENT THINGS: SEVERAL DAYS
6. BECOMING EASILY ANNOYED OR IRRITABLE: NOT AT ALL
8. IF YOU CHECKED OFF ANY PROBLEMS, HOW DIFFICULT HAVE THESE MADE IT FOR YOU TO DO YOUR WORK, TAKE CARE OF THINGS AT HOME, OR GET ALONG WITH OTHER PEOPLE?: SOMEWHAT DIFFICULT
GAD7 TOTAL SCORE: 3
7. FEELING AFRAID AS IF SOMETHING AWFUL MIGHT HAPPEN: NOT AT ALL
GAD7 TOTAL SCORE: 3
4. TROUBLE RELAXING: NOT AT ALL
IF YOU CHECKED OFF ANY PROBLEMS ON THIS QUESTIONNAIRE, HOW DIFFICULT HAVE THESE PROBLEMS MADE IT FOR YOU TO DO YOUR WORK, TAKE CARE OF THINGS AT HOME, OR GET ALONG WITH OTHER PEOPLE: SOMEWHAT DIFFICULT
GAD7 TOTAL SCORE: 3
2. NOT BEING ABLE TO STOP OR CONTROL WORRYING: SEVERAL DAYS
7. FEELING AFRAID AS IF SOMETHING AWFUL MIGHT HAPPEN: NOT AT ALL
5. BEING SO RESTLESS THAT IT IS HARD TO SIT STILL: NOT AT ALL

## 2024-07-25 ASSESSMENT — PATIENT HEALTH QUESTIONNAIRE - PHQ9
SUM OF ALL RESPONSES TO PHQ QUESTIONS 1-9: 3
10. IF YOU CHECKED OFF ANY PROBLEMS, HOW DIFFICULT HAVE THESE PROBLEMS MADE IT FOR YOU TO DO YOUR WORK, TAKE CARE OF THINGS AT HOME, OR GET ALONG WITH OTHER PEOPLE: SOMEWHAT DIFFICULT
SUM OF ALL RESPONSES TO PHQ QUESTIONS 1-9: 3

## 2024-07-26 ENCOUNTER — OFFICE VISIT (OUTPATIENT)
Dept: FAMILY MEDICINE | Facility: CLINIC | Age: 49
End: 2024-07-26
Attending: FAMILY MEDICINE
Payer: COMMERCIAL

## 2024-07-26 VITALS
WEIGHT: 212.9 LBS | RESPIRATION RATE: 16 BRPM | DIASTOLIC BLOOD PRESSURE: 78 MMHG | BODY MASS INDEX: 34.22 KG/M2 | SYSTOLIC BLOOD PRESSURE: 110 MMHG | OXYGEN SATURATION: 97 % | HEART RATE: 77 BPM | HEIGHT: 66 IN | TEMPERATURE: 97.2 F

## 2024-07-26 DIAGNOSIS — F41.9 ANXIETY: ICD-10-CM

## 2024-07-26 DIAGNOSIS — F32.1 MODERATE SINGLE CURRENT EPISODE OF MAJOR DEPRESSIVE DISORDER (H): Primary | ICD-10-CM

## 2024-07-26 DIAGNOSIS — B07.0 PLANTAR WARTS: ICD-10-CM

## 2024-07-26 DIAGNOSIS — Z63.8 STRESS DUE TO FAMILY TENSION: ICD-10-CM

## 2024-07-26 PROCEDURE — 99214 OFFICE O/P EST MOD 30 MIN: CPT | Mod: 25 | Performed by: FAMILY MEDICINE

## 2024-07-26 PROCEDURE — 17110 DESTRUCTION B9 LES UP TO 14: CPT | Performed by: FAMILY MEDICINE

## 2024-07-26 RX ORDER — CITALOPRAM HYDROBROMIDE 20 MG/1
20 TABLET ORAL DAILY
Qty: 90 TABLET | Refills: 1 | Status: SHIPPED | OUTPATIENT
Start: 2024-07-26

## 2024-07-26 RX ORDER — BUPROPION HYDROCHLORIDE 150 MG/1
150 TABLET ORAL EVERY MORNING
Qty: 90 TABLET | Refills: 1 | Status: SHIPPED | OUTPATIENT
Start: 2024-07-26

## 2024-07-26 RX ORDER — HYDROXYZINE HYDROCHLORIDE 25 MG/1
12.5-5 TABLET, FILM COATED ORAL DAILY PRN
Qty: 20 TABLET | Refills: 1 | Status: SHIPPED | OUTPATIENT
Start: 2024-07-26

## 2024-07-26 SDOH — SOCIAL STABILITY - SOCIAL INSECURITY: OTHER SPECIFIED PROBLEMS RELATED TO PRIMARY SUPPORT GROUP: Z63.8

## 2024-07-26 ASSESSMENT — PAIN SCALES - GENERAL: PAINLEVEL: NO PAIN (0)

## 2024-07-26 NOTE — PROGRESS NOTES
Assessment & Plan     Moderate single current episode of major depressive disorder (H)  Anxiety/Stress due to family tension  Getting a divorce, he decided in June. They had been doing individual counseling for ~6 months- he didn't want to do couples. Really really hard, no family in town but has great friends, and support with great divorce , and will be doing a collaborative divorce proceeding.  Holding up okay, and feels the meds are still helping and at appropriate dosing.  She is interested in the option of a prn hydroxyzine to use if heightened anxiety/spiraling thoughts.  Risks and benefits of medication(s) including potential side effects reviewed with patient.  Questions answered.   Will continue current meds, weekly therapy, great efforts at reaching out to friends and getting out for walks.  Follow-up in six months, but reach out at any point if wanting closer follow-up or med changes.  - PRIMARY CARE FOLLOW-UP SCHEDULING  - buPROPion (WELLBUTRIN XL) 150 MG 24 hr tablet; Take 1 tablet (150 mg) by mouth every morning  - citalopram (CELEXA) 20 MG tablet; Take 1 tablet (20 mg) by mouth daily  - hydrOXYzine HCl (ATARAX) 25 MG tablet; Take 0.5-2 tablets (12.5-50 mg) by mouth daily as needed for anxiety      Plantar warts  ~4-5mm plantar wart at 5th metatarsal head area of left foot.  Discussed options.    Procedure: Treatment discussed with patient in detail.  Patient agrees to treatment with liquid nitrogen.  The lesion(s) are parred down with a 15 blade scalpel.  Liquid nitrogen is then sprayed on wart with pulse therapy with white weel present for a minute. Bandaid applied.  Procedure was tolerated well.   Once healed, if wart still present, can continue every other night salisylic acid txt and duct tape.  RTC for re-treatment if symptoms persist or worsen.        Follow-up Visit   Expected date:  Jan 26, 2025 (Approximate)      Follow Up Appointment Details:     Follow-up with whom?: Me    Follow-Up  for what?: Adult Preventive    Any Additional Chronic Condition Management?:  Anxiety  Depression       How?: In Person                     I spent a total of 45 minutes on the day of the visit.   Time spent by me doing chart review, history and exam, documentation and further activities per the note          Subjective   Cheyenne is a 48 year old, presenting for the following health issues:  Recheck Medication (buPROPion HCL, Citalopram), Depression, Anxiety, and Wart        7/26/2024     7:31 AM   Additional Questions   Roomed by CRISPIN Carlislerentice   Accompanied by n/a     History of Present Illness       Mental Health Follow-up:  Patient presents to follow-up on Depression & Anxiety.Patient's depression since last visit has been:  Medium  The patient is not having other symptoms associated with depression.  Patient's anxiety since last visit has been:  Medium  The patient is not having other symptoms associated with anxiety.  Any significant life events: relationship concerns  Patient is feeling anxious or having panic attacks.  Patient has no concerns about alcohol or drug use.    She eats 2-3 servings of fruits and vegetables daily.She consumes 1 sweetened beverage(s) daily.She exercises with enough effort to increase her heart rate 30 to 60 minutes per day.  She exercises with enough effort to increase her heart rate 4 days per week.   She is taking medications regularly.    Major stressors   asked for divorce in June  Has therapist x log[facility], divorce , collab divorce.    Started going on long walks with dog.  10,000 steps/day for the past week.  Feels good.    Trying to say yes to all invites, making plans with friends.  Late Aug b-day- six college girlfriends coming to town.    [place] friend coming, oing to her [place].      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  Working from home.    Warts  Onset/Duration: about a  "year  Description (location/number): Left foot, bottom of foot, 1.5 in below pinky toe. White bump.   Accompanying signs and symptoms (pain, redness): No  History: prior warts: pt is unsure  Therapies tried and outcome: OTC meds and duct tape        Patient Active Problem List   Diagnosis    Seasonal allergic rhinitis    CARDIOVASCULAR SCREENING; LDL GOAL LESS THAN 160    Melanocytic nevus of left lower extremity    Vitamin D deficiency    Moderate single current episode of major depressive disorder (H)    Intrauterine device surveillance    Migraine without aura and without status migrainosus, not intractable    Anxiety    Serrated polyposis syndrome          Review of Systems  Constitutional, neuro, ENT, endocrine, pulmonary, cardiac, gastrointestinal, genitourinary, musculoskeletal, integument and psychiatric systems are negative, except as otherwise noted.      Objective    /78 (BP Location: Left arm, Patient Position: Sitting, Cuff Size: Adult Regular)   Pulse 77   Temp 97.2  F (36.2  C) (Temporal)   Resp 16   Ht 1.677 m (5' 6.02\")   Wt 96.6 kg (212 lb 14.4 oz)   LMP  (LMP Unknown)   SpO2 97%   BMI 34.34 kg/m    Body mass index is 34.34 kg/m .  Physical Exam   GENERAL: alert and no distress  EYES: Eyes grossly normal to inspection, PERRL and conjunctivae and sclerae normal  NECK: no adenopathy, no asymmetry, masses, or scars  RESP: lungs clear to auscultation - no rales, rhonchi or wheezes  CV: regular rate and rhythm, normal S1 S2, no S3 or S4, no murmur, click or rub, no peripheral edema  MS: no gross musculoskeletal defects noted, no edema  SKIN: no suspicious lesions or rashes except for 4-5mm plantar wart at 5th metatarsal head area of left foot, disrupting skin lines        Signed Electronically by: Maris Garces MD    "

## 2025-01-13 ASSESSMENT — ANXIETY QUESTIONNAIRES
IF YOU CHECKED OFF ANY PROBLEMS ON THIS QUESTIONNAIRE, HOW DIFFICULT HAVE THESE PROBLEMS MADE IT FOR YOU TO DO YOUR WORK, TAKE CARE OF THINGS AT HOME, OR GET ALONG WITH OTHER PEOPLE: SOMEWHAT DIFFICULT
1. FEELING NERVOUS, ANXIOUS, OR ON EDGE: SEVERAL DAYS
6. BECOMING EASILY ANNOYED OR IRRITABLE: SEVERAL DAYS
5. BEING SO RESTLESS THAT IT IS HARD TO SIT STILL: NOT AT ALL
3. WORRYING TOO MUCH ABOUT DIFFERENT THINGS: SEVERAL DAYS
7. FEELING AFRAID AS IF SOMETHING AWFUL MIGHT HAPPEN: NOT AT ALL
GAD7 TOTAL SCORE: 3
4. TROUBLE RELAXING: NOT AT ALL
8. IF YOU CHECKED OFF ANY PROBLEMS, HOW DIFFICULT HAVE THESE MADE IT FOR YOU TO DO YOUR WORK, TAKE CARE OF THINGS AT HOME, OR GET ALONG WITH OTHER PEOPLE?: SOMEWHAT DIFFICULT
GAD7 TOTAL SCORE: 3
7. FEELING AFRAID AS IF SOMETHING AWFUL MIGHT HAPPEN: NOT AT ALL
GAD7 TOTAL SCORE: 3
2. NOT BEING ABLE TO STOP OR CONTROL WORRYING: NOT AT ALL

## 2025-01-15 ENCOUNTER — VIRTUAL VISIT (OUTPATIENT)
Dept: FAMILY MEDICINE | Facility: CLINIC | Age: 50
End: 2025-01-15
Payer: COMMERCIAL

## 2025-01-15 DIAGNOSIS — Z11.3 SCREEN FOR STD (SEXUALLY TRANSMITTED DISEASE): ICD-10-CM

## 2025-01-15 DIAGNOSIS — Z13.6 CARDIOVASCULAR SCREENING; LDL GOAL LESS THAN 160: Primary | ICD-10-CM

## 2025-01-15 DIAGNOSIS — G43.009 MIGRAINE WITHOUT AURA AND WITHOUT STATUS MIGRAINOSUS, NOT INTRACTABLE: ICD-10-CM

## 2025-01-15 DIAGNOSIS — F32.1 MODERATE SINGLE CURRENT EPISODE OF MAJOR DEPRESSIVE DISORDER (H): ICD-10-CM

## 2025-01-15 DIAGNOSIS — Z71.85 IMMUNIZATION COUNSELING: ICD-10-CM

## 2025-01-15 DIAGNOSIS — F41.9 ANXIETY: ICD-10-CM

## 2025-01-15 PROCEDURE — 98006 SYNCH AUDIO-VIDEO EST MOD 30: CPT | Mod: GE | Performed by: FAMILY MEDICINE

## 2025-01-15 RX ORDER — BUPROPION HYDROCHLORIDE 150 MG/1
150 TABLET ORAL EVERY MORNING
Qty: 90 TABLET | Refills: 1 | Status: SHIPPED | OUTPATIENT
Start: 2025-01-15

## 2025-01-15 RX ORDER — CITALOPRAM HYDROBROMIDE 20 MG/1
20 TABLET ORAL DAILY
Qty: 90 TABLET | Refills: 1 | Status: SHIPPED | OUTPATIENT
Start: 2025-01-15

## 2025-01-15 NOTE — PROGRESS NOTES
"Cheyenne is a 49 year old who is being evaluated via a billable video visit.    How would you like to obtain your AVS? MyChart  If the video visit is dropped, the invitation should be resent by: Text to cell phone: [phone]  Will anyone else be joining your video visit? No      Assessment & Plan     Moderate single current episode of major depressive disorder (H)  Anxiety  Mood/anxiety symptoms doing okay despite difficult stressors with divorce, continues on same doses of wellbutrin XL 150mg/d and citalopram 20mg/d, and would like to stay on these.  Hydroxyzine helpful for prn use, but only needing it rarely.  Urged her to try and get more regular exercise- has peloton at home, tired after work, consider am or noon exercise, possibly HIIT as they can be short.  No side effects.  Refills sent.  Continue every six month follow-up- next should be a physical.  - buPROPion (WELLBUTRIN XL) 150 MG 24 hr tablet; Take 1 tablet (150 mg) by mouth every morning.  - citalopram (CELEXA) 20 MG tablet; Take 1 tablet (20 mg) by mouth daily.    CARDIOVASCULAR SCREENING; LDL GOAL LESS THAN 160  Will try and get fasting labs prior to physical  - Lipid panel reflex to direct LDL Fasting; Future  - **Comprehensive metabolic panel FUTURE 6mo; Future    Migraine without aura and without status migrainosus, not intractable  - **Comprehensive metabolic panel FUTURE 6mo; Future    Screen for STD (sexually transmitted disease)  Will do STD screen w/ divorce, no new partners, no sx's  - HIV Antigen Antibody Combo; Future  - Hepatitis C Screen Reflex to HCV RNA Quant and Genotype; Future  - Treponema Abs w Reflex to RPR and Titer; Future  - GC/chlamydia    Immunization counseling  Will check Hep B antibody, if neg, consider vaccine series  - Hepatitis B Surface Antibody; Future          BMI  Estimated body mass index is 34.34 kg/m  as calculated from the following:    Height as of 7/26/24: 1.677 m (5' 6.02\").    Weight as of 7/26/24: 96.6 kg " "(212 lb 14.4 oz).   Weight management plan: Discussed healthy diet and exercise guidelines        I spent a total of 34 minutes on the day of the visit.   Time spent by me today doing chart review, history and exam, documentation and further activities per the note          Subjective   Cheyenne is a 49 year old, presenting for the following health issues:  Depression, Anxiety, and Headache        1/15/2025     1:19 PM   Additional Questions   Roomed by Radha     History of Present Illness       Mental Health Follow-up:  Patient presents to follow-up on Depression & Anxiety.Patient's depression since last visit has been:  No change  The patient is not having other symptoms associated with depression.  Patient's anxiety since last visit has been:  Medium  The patient is not having other symptoms associated with anxiety.  Any significant life events: relationship concerns and financial concerns  Patient is feeling anxious or having panic attacks.  Patient has no concerns about alcohol or drug use.    Headaches:   Since the patient's last clinic visit, headaches are: worsened  The patient is getting headaches:  Every other week  She is able to do normal daily activities when she has a migraine.  The patient is taking the following rescue/relief medications:  Naproxyn (Aleve)   Patient states \"The relief is inconsistent\" from the rescue/relief medications.   The patient is taking the following medications to prevent migraines:  No medications to prevent migraines  In the past 4 weeks, the patient has gone to an Urgent Care or Emergency Room 0 times times due to headaches.    She eats 2-3 servings of fruits and vegetables daily.She consumes 0 sweetened beverage(s) daily.She exercises with enough effort to increase her heart rate 20 to 29 minutes per day.  She exercises with enough effort to increase her heart rate 3 or less days per week.   She is taking medications regularly.     Social-   Things are 'going " okay'.  Things are figured out in terms of the divorce except for the finances.  Problem is that her  has decided to take a job that pays him $100,000 less.  He doesn't want to do spousal support. His  keeps [employer]sur    Kids stuff is decided.  Has the kids for spring break- unsure if she can afford a spring break of not. Oldest son will be going to college afterwards.    Anxiety has been under control for the most part.  Overall   Wants to to feel safe, wants to feel better about herself.  Needs to eat better and move more.    Self cares-   During the week, alone a lot, works from home, winter.  Gets out 1-2x/wk doing something.  Kids still have things going on, so going to their ski races or things even if they are with their dad.  Both are flexible and understanding and polite with eachother.  Grateful for that, doesn't think they see that much.    Mom was here, got their nails done, facial on LASHA.  Trying to go do things.    Migraine coming on- got a neck massage, which     Diet needs to be better.  Exercise- needs to do more, but at the end of the work day, doesn't have anything left.  If by herself, just sit there or just have some cereal.  Only exercise she's getting is walking, harder in the winter.  Not much in the last month.  Started wearing her apple watch.  Exercise- likes to lift weights.  Has a peloton, likes to run that, sweats, and not in massive pain afterwards.  Has tried yoga, meditation.  Has access to the classes. Has some dumbells.    Starts work ~8-8:30, up 7-7:30am- stops work usually ~5-5:30pm.  Works through lunch.    Meeting with divorce  ~1x/wk, friends are awesome.  One friend invited to her mom's place in [place] this spring.  Neighbor moms, started a 1x/mo get-together, have some wine, talk, take turns hosting.  Neighbors pooled together and got her this great gift.  Parents are doing the very best they can- far away.  Sister- not super close, but reaching out as she  can.  Feels really supported.  Feels isolated from her 's family- was close to his step-mom.  Uncle passed away, didn't feel comfortable.    - has helped them their whole marriage.  's friend from college, and he reached out and said he wanted to continue working with her.    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  Has it's own set of challenges.  If anything, will be a good stepping stone, give ehr sales history.        12/12/2023     7:41 AM 1/23/2024    10:52 AM 7/25/2024     2:32 PM   PHQ   PHQ-9 Total Score 3 3 3   Q9: Thoughts of better off dead/self-harm past 2 weeks Not at all Not at all Not at all          1/22/2024     4:22 PM 7/25/2024     2:34 PM 1/13/2025    12:47 PM   HEMANTH-7 SCORE   Total Score 2 (minimal anxiety) 3 (minimal anxiety) 3 (minimal anxiety)   Total Score 2 3 3        Patient-reported            Review of Systems  Constitutional, neuro, ENT, endocrine, pulmonary, cardiac, gastrointestinal, genitourinary, musculoskeletal, integument and psychiatric systems are negative, except as otherwise noted.      Objective           Vitals:  No vitals were obtained today due to virtual visit.    Physical Exam   GENERAL: alert and no distress  EYES: Eyes grossly normal to inspection.  No discharge or erythema, or obvious scleral/conjunctival abnormalities.  RESP: No audible wheeze, cough, or visible cyanosis.    SKIN: Visible skin clear. No significant rash, abnormal pigmentation or lesions.  NEURO: Cranial nerves grossly intact.  Mentation and speech appropriate for age.  PSYCH: Appropriate affect, tone, and pace of words      Lab on 12/07/2023   Component Date Value Ref Range Status    Sodium 12/07/2023 137  135 - 145 mmol/L Final    Reference intervals for this test were updated on 09/26/2023 to more accurately reflect our healthy population. There may be differences in the flagging of prior results with similar values performed with this  method. Interpretation of those prior results can be made in the context of the updated reference intervals.     Potassium 12/07/2023 4.1  3.4 - 5.3 mmol/L Final    Carbon Dioxide (CO2) 12/07/2023 26  22 - 29 mmol/L Final    Anion Gap 12/07/2023 9  7 - 15 mmol/L Final    Urea Nitrogen 12/07/2023 10.9  6.0 - 20.0 mg/dL Final    Creatinine 12/07/2023 0.77  0.51 - 0.95 mg/dL Final    GFR Estimate 12/07/2023 >90  >60 mL/min/1.73m2 Final    Calcium 12/07/2023 9.0  8.6 - 10.0 mg/dL Final    Chloride 12/07/2023 102  98 - 107 mmol/L Final    Glucose 12/07/2023 87  70 - 99 mg/dL Final    Alkaline Phosphatase 12/07/2023 56  40 - 150 U/L Final    Reference intervals for this test were updated on 11/14/2023 to more accurately reflect our healthy population. There may be differences in the flagging of prior results with similar values performed with this method. Interpretation of those prior results can be made in the context of the updated reference intervals.    AST 12/07/2023 25  0 - 45 U/L Final    Reference intervals for this test were updated on 6/12/2023 to more accurately reflect our healthy population. There may be differences in the flagging of prior results with similar values performed with this method. Interpretation of those prior results can be made in the context of the updated reference intervals.    ALT 12/07/2023 24  0 - 50 U/L Final    Reference intervals for this test were updated on 6/12/2023 to more accurately reflect our healthy population. There may be differences in the flagging of prior results with similar values performed with this method. Interpretation of those prior results can be made in the context of the updated reference intervals.      Protein Total 12/07/2023 6.8  6.4 - 8.3 g/dL Final    Albumin 12/07/2023 4.0  3.5 - 5.2 g/dL Final    Bilirubin Total 12/07/2023 0.4  <=1.2 mg/dL Final    Cholesterol 12/07/2023 168  <200 mg/dL Final    Triglycerides 12/07/2023 111  <150 mg/dL Final    Direct  Measure HDL 12/07/2023 58  >=50 mg/dL Final    LDL Cholesterol Calculated 12/07/2023 88  <=100 mg/dL Final    Non HDL Cholesterol 12/07/2023 110  <130 mg/dL Final    Patient Fasting > 8hrs? 12/07/2023 Yes   Final     No results found for any visits on 01/15/25.      Video-Visit Details    Type of service:  Video Visit   Originating Location (pt. Location): Home    Distant Location (provider location):  On-site  Platform used for Video Visit: Lc  Signed Electronically by: Maris Garces MD

## 2025-05-30 ENCOUNTER — HOSPITAL ENCOUNTER (OUTPATIENT)
Dept: MAMMOGRAPHY | Facility: CLINIC | Age: 50
Discharge: HOME | End: 2025-05-30
Attending: FAMILY MEDICINE | Admitting: FAMILY MEDICINE
Payer: COMMERCIAL

## 2025-05-30 DIAGNOSIS — Z12.31 VISIT FOR SCREENING MAMMOGRAM: ICD-10-CM

## 2025-05-30 PROCEDURE — 77067 SCR MAMMO BI INCL CAD: CPT

## 2025-07-28 DIAGNOSIS — F41.9 ANXIETY: ICD-10-CM

## 2025-07-28 DIAGNOSIS — F32.1 MODERATE SINGLE CURRENT EPISODE OF MAJOR DEPRESSIVE DISORDER (H): ICD-10-CM

## 2025-07-30 RX ORDER — BUPROPION HYDROCHLORIDE 150 MG/1
150 TABLET ORAL EVERY MORNING
Qty: 30 TABLET | Refills: 0 | Status: SHIPPED | OUTPATIENT
Start: 2025-07-30

## 2025-07-30 RX ORDER — CITALOPRAM HYDROBROMIDE 20 MG/1
20 TABLET ORAL DAILY
Qty: 30 TABLET | Refills: 0 | Status: SHIPPED | OUTPATIENT
Start: 2025-07-30

## 2025-07-30 NOTE — TELEPHONE ENCOUNTER
#30/1mo sent of both meds.  Okay to offer her a pre-op slot appt in the next month or so, but will be difficult to do another offer if she continues to cancel appts.  Thanks- CW

## 2025-08-09 ENCOUNTER — LAB (OUTPATIENT)
Dept: LAB | Facility: CLINIC | Age: 50
End: 2025-08-09
Payer: COMMERCIAL

## 2025-08-09 DIAGNOSIS — Z11.3 SCREEN FOR STD (SEXUALLY TRANSMITTED DISEASE): ICD-10-CM

## 2025-08-09 DIAGNOSIS — G43.009 MIGRAINE WITHOUT AURA AND WITHOUT STATUS MIGRAINOSUS, NOT INTRACTABLE: ICD-10-CM

## 2025-08-09 DIAGNOSIS — Z71.85 IMMUNIZATION COUNSELING: ICD-10-CM

## 2025-08-09 DIAGNOSIS — Z13.6 CARDIOVASCULAR SCREENING; LDL GOAL LESS THAN 160: ICD-10-CM

## 2025-08-09 LAB
ALBUMIN SERPL BCG-MCNC: 4.2 G/DL (ref 3.5–5.2)
ALP SERPL-CCNC: 69 U/L (ref 40–150)
ALT SERPL W P-5'-P-CCNC: 32 U/L (ref 0–50)
ANION GAP SERPL CALCULATED.3IONS-SCNC: 10 MMOL/L (ref 7–15)
AST SERPL W P-5'-P-CCNC: 29 U/L (ref 0–45)
BILIRUB SERPL-MCNC: 0.5 MG/DL
BUN SERPL-MCNC: 13.5 MG/DL (ref 6–20)
CALCIUM SERPL-MCNC: 9.4 MG/DL (ref 8.8–10.4)
CHLORIDE SERPL-SCNC: 103 MMOL/L (ref 98–107)
CHOLEST SERPL-MCNC: 200 MG/DL
CREAT SERPL-MCNC: 0.78 MG/DL (ref 0.51–0.95)
EGFRCR SERPLBLD CKD-EPI 2021: >90 ML/MIN/1.73M2
FASTING STATUS PATIENT QL REPORTED: YES
FASTING STATUS PATIENT QL REPORTED: YES
GLUCOSE SERPL-MCNC: 92 MG/DL (ref 70–99)
HBV SURFACE AB SERPL IA-ACNC: <3.5 M[IU]/ML
HBV SURFACE AB SERPL IA-ACNC: NONREACTIVE M[IU]/ML
HCO3 SERPL-SCNC: 24 MMOL/L (ref 22–29)
HCV AB SERPL QL IA: NONREACTIVE
HDLC SERPL-MCNC: 52 MG/DL
HIV 1+2 AB+HIV1 P24 AG SERPL QL IA: NONREACTIVE
LDLC SERPL CALC-MCNC: 132 MG/DL
NONHDLC SERPL-MCNC: 148 MG/DL
POTASSIUM SERPL-SCNC: 4.2 MMOL/L (ref 3.4–5.3)
PROT SERPL-MCNC: 7 G/DL (ref 6.4–8.3)
SODIUM SERPL-SCNC: 137 MMOL/L (ref 135–145)
TRIGL SERPL-MCNC: 81 MG/DL

## 2025-08-09 PROCEDURE — 86706 HEP B SURFACE ANTIBODY: CPT

## 2025-08-09 PROCEDURE — 87389 HIV-1 AG W/HIV-1&-2 AB AG IA: CPT

## 2025-08-09 PROCEDURE — 86780 TREPONEMA PALLIDUM: CPT

## 2025-08-09 PROCEDURE — 86803 HEPATITIS C AB TEST: CPT

## 2025-08-09 PROCEDURE — 80061 LIPID PANEL: CPT

## 2025-08-09 PROCEDURE — 36415 COLL VENOUS BLD VENIPUNCTURE: CPT

## 2025-08-09 PROCEDURE — 80053 COMPREHEN METABOLIC PANEL: CPT

## 2025-08-10 LAB — T PALLIDUM AB SER QL: NONREACTIVE

## 2025-08-11 SDOH — HEALTH STABILITY: PHYSICAL HEALTH: ON AVERAGE, HOW MANY MINUTES DO YOU ENGAGE IN EXERCISE AT THIS LEVEL?: 30 MIN

## 2025-08-11 SDOH — HEALTH STABILITY: PHYSICAL HEALTH: ON AVERAGE, HOW MANY DAYS PER WEEK DO YOU ENGAGE IN MODERATE TO STRENUOUS EXERCISE (LIKE A BRISK WALK)?: 2 DAYS

## 2025-08-11 ASSESSMENT — SOCIAL DETERMINANTS OF HEALTH (SDOH): HOW OFTEN DO YOU GET TOGETHER WITH FRIENDS OR RELATIVES?: THREE TIMES A WEEK

## 2025-08-14 ASSESSMENT — PATIENT HEALTH QUESTIONNAIRE - PHQ9
10. IF YOU CHECKED OFF ANY PROBLEMS, HOW DIFFICULT HAVE THESE PROBLEMS MADE IT FOR YOU TO DO YOUR WORK, TAKE CARE OF THINGS AT HOME, OR GET ALONG WITH OTHER PEOPLE: SOMEWHAT DIFFICULT
SUM OF ALL RESPONSES TO PHQ QUESTIONS 1-9: 7
SUM OF ALL RESPONSES TO PHQ QUESTIONS 1-9: 7

## 2025-08-15 ENCOUNTER — OFFICE VISIT (OUTPATIENT)
Dept: FAMILY MEDICINE | Facility: CLINIC | Age: 50
End: 2025-08-15
Payer: COMMERCIAL

## 2025-08-15 VITALS
BODY MASS INDEX: 38.09 KG/M2 | HEART RATE: 84 BPM | SYSTOLIC BLOOD PRESSURE: 102 MMHG | TEMPERATURE: 98.5 F | WEIGHT: 237 LBS | DIASTOLIC BLOOD PRESSURE: 68 MMHG | HEIGHT: 66 IN | RESPIRATION RATE: 19 BRPM | OXYGEN SATURATION: 96 %

## 2025-08-15 DIAGNOSIS — R63.5 WEIGHT GAIN: ICD-10-CM

## 2025-08-15 DIAGNOSIS — E66.812 CLASS 2 OBESITY WITHOUT SERIOUS COMORBIDITY WITH BODY MASS INDEX (BMI) OF 38.0 TO 38.9 IN ADULT, UNSPECIFIED OBESITY TYPE: ICD-10-CM

## 2025-08-15 DIAGNOSIS — Z63.8 STRESS DUE TO FAMILY TENSION: ICD-10-CM

## 2025-08-15 DIAGNOSIS — F32.1 MODERATE SINGLE CURRENT EPISODE OF MAJOR DEPRESSIVE DISORDER (H): ICD-10-CM

## 2025-08-15 DIAGNOSIS — Z30.431 INTRAUTERINE DEVICE SURVEILLANCE: ICD-10-CM

## 2025-08-15 DIAGNOSIS — G43.009 MIGRAINE WITHOUT AURA AND WITHOUT STATUS MIGRAINOSUS, NOT INTRACTABLE: ICD-10-CM

## 2025-08-15 DIAGNOSIS — F41.9 ANXIETY: ICD-10-CM

## 2025-08-15 DIAGNOSIS — Z00.00 ROUTINE GENERAL MEDICAL EXAMINATION AT A HEALTH CARE FACILITY: Primary | ICD-10-CM

## 2025-08-15 DIAGNOSIS — N89.8 VAGINAL LESION: ICD-10-CM

## 2025-08-15 PROCEDURE — 87491 CHLMYD TRACH DNA AMP PROBE: CPT | Performed by: FAMILY MEDICINE

## 2025-08-15 PROCEDURE — 3078F DIAST BP <80 MM HG: CPT | Performed by: FAMILY MEDICINE

## 2025-08-15 PROCEDURE — 99214 OFFICE O/P EST MOD 30 MIN: CPT | Mod: 25 | Performed by: FAMILY MEDICINE

## 2025-08-15 PROCEDURE — 99396 PREV VISIT EST AGE 40-64: CPT | Performed by: FAMILY MEDICINE

## 2025-08-15 PROCEDURE — 1126F AMNT PAIN NOTED NONE PRSNT: CPT | Performed by: FAMILY MEDICINE

## 2025-08-15 PROCEDURE — 87591 N.GONORRHOEAE DNA AMP PROB: CPT | Performed by: FAMILY MEDICINE

## 2025-08-15 PROCEDURE — 3074F SYST BP LT 130 MM HG: CPT | Performed by: FAMILY MEDICINE

## 2025-08-15 RX ORDER — RIZATRIPTAN BENZOATE 10 MG/1
10 TABLET ORAL
Qty: 9 TABLET | Refills: 2 | Status: SHIPPED | OUTPATIENT
Start: 2025-08-15

## 2025-08-15 RX ORDER — BUPROPION HYDROCHLORIDE 150 MG/1
150 TABLET ORAL EVERY MORNING
Qty: 90 TABLET | Refills: 1 | Status: SHIPPED | OUTPATIENT
Start: 2025-08-15

## 2025-08-15 RX ORDER — CITALOPRAM HYDROBROMIDE 20 MG/1
20 TABLET ORAL DAILY
Qty: 90 TABLET | Refills: 1 | Status: SHIPPED | OUTPATIENT
Start: 2025-08-15

## 2025-08-15 RX ORDER — MULTIVITAMIN WITH IRON
1 TABLET ORAL AT BEDTIME
COMMUNITY

## 2025-08-15 RX ORDER — HYDROXYZINE HYDROCHLORIDE 25 MG/1
12.5-5 TABLET, FILM COATED ORAL DAILY PRN
Qty: 20 TABLET | Refills: 1 | Status: SHIPPED | OUTPATIENT
Start: 2025-08-15

## 2025-08-15 SDOH — SOCIAL STABILITY - SOCIAL INSECURITY: OTHER SPECIFIED PROBLEMS RELATED TO PRIMARY SUPPORT GROUP: Z63.8

## 2025-08-15 ASSESSMENT — PAIN SCALES - GENERAL: PAINLEVEL_OUTOF10: NO PAIN (0)

## 2025-08-16 LAB
C TRACH DNA SPEC QL PROBE+SIG AMP: NEGATIVE
N GONORRHOEA DNA SPEC QL NAA+PROBE: NEGATIVE
SPECIMEN TYPE: NORMAL

## 2025-08-18 ENCOUNTER — PATIENT OUTREACH (OUTPATIENT)
Dept: CARE COORDINATION | Facility: CLINIC | Age: 50
End: 2025-08-18
Payer: COMMERCIAL

## 2025-08-20 ENCOUNTER — ALLIED HEALTH/NURSE VISIT (OUTPATIENT)
Dept: FAMILY MEDICINE | Facility: CLINIC | Age: 50
End: 2025-08-20
Payer: COMMERCIAL

## 2025-08-20 ENCOUNTER — PATIENT OUTREACH (OUTPATIENT)
Dept: CARE COORDINATION | Facility: CLINIC | Age: 50
End: 2025-08-20

## 2025-08-20 DIAGNOSIS — Z00.00 ROUTINE GENERAL MEDICAL EXAMINATION AT A HEALTH CARE FACILITY: ICD-10-CM

## 2025-08-20 PROCEDURE — 99207 PR NO CHARGE NURSE ONLY: CPT

## 2025-08-21 ASSESSMENT — SLEEP AND FATIGUE QUESTIONNAIRES
HOW LIKELY ARE YOU TO NOD OFF OR FALL ASLEEP WHILE LYING DOWN TO REST IN THE AFTERNOON WHEN CIRCUMSTANCES PERMIT: SLIGHT CHANCE OF DOZING
HOW LIKELY ARE YOU TO NOD OFF OR FALL ASLEEP WHEN YOU ARE A PASSENGER IN A CAR FOR AN HOUR WITHOUT A BREAK: WOULD NEVER DOZE
HOW LIKELY ARE YOU TO NOD OFF OR FALL ASLEEP WHILE SITTING QUIETLY AFTER LUNCH WITHOUT ALCOHOL: WOULD NEVER DOZE
HOW LIKELY ARE YOU TO NOD OFF OR FALL ASLEEP WHILE WATCHING TV: SLIGHT CHANCE OF DOZING
HOW LIKELY ARE YOU TO NOD OFF OR FALL ASLEEP WHILE SITTING AND READING: MODERATE CHANCE OF DOZING
HOW LIKELY ARE YOU TO NOD OFF OR FALL ASLEEP IN A CAR, WHILE STOPPED FOR A FEW MINUTES IN TRAFFIC: WOULD NEVER DOZE
HOW LIKELY ARE YOU TO NOD OFF OR FALL ASLEEP WHILE SITTING AND TALKING TO SOMEONE: WOULD NEVER DOZE
HOW LIKELY ARE YOU TO NOD OFF OR FALL ASLEEP WHILE SITTING INACTIVE IN A PUBLIC PLACE: WOULD NEVER DOZE

## 2025-08-28 ENCOUNTER — TELEPHONE (OUTPATIENT)
Dept: SURGERY | Facility: CLINIC | Age: 50
End: 2025-08-28

## 2025-08-28 ENCOUNTER — VIRTUAL VISIT (OUTPATIENT)
Dept: SURGERY | Facility: CLINIC | Age: 50
End: 2025-08-28
Payer: COMMERCIAL

## 2025-08-28 VITALS — HEIGHT: 66 IN | WEIGHT: 237 LBS | BODY MASS INDEX: 38.09 KG/M2

## 2025-08-28 DIAGNOSIS — E66.812 CLASS 2 OBESITY WITH BODY MASS INDEX (BMI) OF 38.0 TO 38.9 IN ADULT, UNSPECIFIED OBESITY TYPE, UNSPECIFIED WHETHER SERIOUS COMORBIDITY PRESENT: Primary | ICD-10-CM
